# Patient Record
Sex: MALE | Race: WHITE | NOT HISPANIC OR LATINO | Employment: OTHER | URBAN - METROPOLITAN AREA
[De-identification: names, ages, dates, MRNs, and addresses within clinical notes are randomized per-mention and may not be internally consistent; named-entity substitution may affect disease eponyms.]

---

## 2017-02-08 RX ORDER — LOSARTAN POTASSIUM AND HYDROCHLOROTHIAZIDE 12.5; 5 MG/1; MG/1
TABLET ORAL
Qty: 90 TABLET | Refills: 1 | Status: SHIPPED | OUTPATIENT
Start: 2017-02-08 | End: 2017-05-08

## 2017-02-17 ENCOUNTER — OFFICE VISIT (OUTPATIENT)
Dept: INTERNAL MEDICINE | Facility: CLINIC | Age: 82
End: 2017-02-17

## 2017-02-17 VITALS
HEART RATE: 78 BPM | SYSTOLIC BLOOD PRESSURE: 118 MMHG | BODY MASS INDEX: 26.94 KG/M2 | HEIGHT: 70 IN | DIASTOLIC BLOOD PRESSURE: 80 MMHG | WEIGHT: 188.2 LBS

## 2017-02-17 DIAGNOSIS — E78.49 OTHER HYPERLIPIDEMIA: ICD-10-CM

## 2017-02-17 DIAGNOSIS — I10 ESSENTIAL HYPERTENSION: ICD-10-CM

## 2017-02-17 DIAGNOSIS — K21.9 GASTROESOPHAGEAL REFLUX DISEASE WITHOUT ESOPHAGITIS: ICD-10-CM

## 2017-02-17 DIAGNOSIS — M51.36 DEGENERATION OF INTERVERTEBRAL DISC OF LUMBAR REGION: Primary | ICD-10-CM

## 2017-02-17 DIAGNOSIS — I25.10 CHRONIC CORONARY ARTERY DISEASE: ICD-10-CM

## 2017-02-17 DIAGNOSIS — Z23 NEED FOR PNEUMOCOCCAL VACCINE: ICD-10-CM

## 2017-02-17 LAB
ALBUMIN SERPL-MCNC: 4.3 G/DL (ref 3.5–5.2)
ALBUMIN/GLOB SERPL: 1.8 G/DL
ALP SERPL-CCNC: 89 U/L (ref 39–117)
ALT SERPL-CCNC: 10 U/L (ref 1–41)
AST SERPL-CCNC: 13 U/L (ref 1–40)
BILIRUB SERPL-MCNC: 0.6 MG/DL (ref 0.1–1.2)
BUN SERPL-MCNC: 19 MG/DL (ref 8–23)
BUN/CREAT SERPL: 17.9 (ref 7–25)
CALCIUM SERPL-MCNC: 9.2 MG/DL (ref 8.6–10.5)
CHLORIDE SERPL-SCNC: 103 MMOL/L (ref 98–107)
CHOLEST SERPL-MCNC: 156 MG/DL (ref 0–200)
CO2 SERPL-SCNC: 24.2 MMOL/L (ref 22–29)
CREAT SERPL-MCNC: 1.06 MG/DL (ref 0.76–1.27)
GLOBULIN SER CALC-MCNC: 2.4 GM/DL
GLUCOSE SERPL-MCNC: 100 MG/DL (ref 65–99)
HDLC SERPL-MCNC: 38 MG/DL (ref 40–60)
LDLC SERPL CALC-MCNC: 96 MG/DL (ref 0–100)
POTASSIUM SERPL-SCNC: 4.2 MMOL/L (ref 3.5–5.2)
PROT SERPL-MCNC: 6.7 G/DL (ref 6–8.5)
SODIUM SERPL-SCNC: 143 MMOL/L (ref 136–145)
TRIGL SERPL-MCNC: 111 MG/DL (ref 0–150)
VLDLC SERPL-MCNC: 22.2 MG/DL (ref 5–40)

## 2017-02-17 PROCEDURE — 90670 PCV13 VACCINE IM: CPT | Performed by: INTERNAL MEDICINE

## 2017-02-17 PROCEDURE — 99214 OFFICE O/P EST MOD 30 MIN: CPT | Performed by: INTERNAL MEDICINE

## 2017-02-17 PROCEDURE — G0009 ADMIN PNEUMOCOCCAL VACCINE: HCPCS | Performed by: INTERNAL MEDICINE

## 2017-02-17 RX ORDER — CYCLOBENZAPRINE HCL 10 MG
10 TABLET ORAL
Qty: 30 TABLET | Refills: 5 | Status: SHIPPED | OUTPATIENT
Start: 2017-02-17 | End: 2018-04-18

## 2017-02-17 NOTE — PROGRESS NOTES
Subjective   Akin Chaudhry is a 81 y.o. male.     History of Present Illness he is here today for follow-up of hypertension, hypercholesterolemia, and coronary artery disease.  His difficulties with abdominal pain during 2016 have resolved spontaneously.  He is no longer using Nexium or Prevacid for reflux.  He occasionally will take TUMS.  He denies any PND, dyspnea on exertion, or chest pain.  He has had no further dizziness.  He denies any focal neurologic symptoms.  His bowel habit is normal and he denies any melanotic stool or rectal bleeding.  His only real complaint today is diffuse chronic joint pain as well as low back pain.  He does not take any medication for this.  Used an epidural block once for his low back pain without relief.  He also complains of pain in his thighs at night especially.  His low back pain improves as the morning goes on generally.        Review of Systems   Constitutional: Negative for activity change, appetite change and fatigue.   HENT: Negative for trouble swallowing.    Respiratory: Negative for cough, chest tightness, shortness of breath and wheezing.    Cardiovascular: Negative for chest pain, palpitations and leg swelling.   Gastrointestinal: Negative for abdominal pain, blood in stool, constipation, diarrhea, nausea and vomiting.   Genitourinary: Negative for difficulty urinating, dysuria, flank pain, frequency and hematuria.   Musculoskeletal: Positive for arthralgias and back pain. Negative for gait problem and joint swelling.   Neurological: Negative for dizziness, syncope, facial asymmetry, speech difficulty, weakness, numbness and headaches.   Psychiatric/Behavioral: Negative for confusion and dysphoric mood. The patient is not nervous/anxious.        Objective   Physical Exam   Constitutional: He is oriented to person, place, and time. Vital signs are normal. He appears well-developed and well-nourished. He is active. He does not appear ill.   Eyes: Conjunctivae are  normal.   Fundoscopic exam:       The right eye shows no AV nicking, no exudate and no hemorrhage.        The left eye shows no AV nicking, no exudate and no hemorrhage.   Neck: Carotid bruit is not present. No thyroid mass and no thyromegaly present.   Cardiovascular: Normal rate, regular rhythm, S1 normal and S2 normal.  Exam reveals no S3 and no S4.    No murmur heard.  Pulses:       Posterior tibial pulses are 2+ on the right side, and 2+ on the left side.   Pulmonary/Chest: No tachypnea. No respiratory distress. He has no decreased breath sounds. He has no wheezes. He has no rhonchi. He has no rales.   Abdominal: Soft. Normal appearance and bowel sounds are normal. He exhibits no abdominal bruit and no mass. There is no hepatosplenomegaly. There is no tenderness.       Vascular Status -  His exam exhibits no right foot edema. His exam exhibits no left foot edema.  Neurological: He is alert and oriented to person, place, and time.   Reflex Scores:       Bicep reflexes are 2+ on the right side.  Psychiatric: He has a normal mood and affect. His speech is normal and behavior is normal. Judgment and thought content normal. Cognition and memory are normal.       Assessment/Plan  assessment #1 hypertension-good control #2 coronary artery disease-quiescent #3 hypercholesterolemia-hopefully at goal now that he is back on Lipitor    Plan #1 flu vaccination and Prevnar 13 today #2 CMP and lipids today.  Routine follow-up with me in 4 months.

## 2017-03-13 RX ORDER — FINASTERIDE 1 MG/1
TABLET, FILM COATED ORAL
Qty: 90 TABLET | Refills: 1 | Status: SHIPPED | OUTPATIENT
Start: 2017-03-13 | End: 2017-12-08 | Stop reason: SDUPTHER

## 2017-03-13 RX ORDER — ATORVASTATIN CALCIUM 40 MG/1
TABLET, FILM COATED ORAL
Qty: 90 TABLET | Refills: 1 | Status: SHIPPED | OUTPATIENT
Start: 2017-03-13 | End: 2017-10-18 | Stop reason: SDUPTHER

## 2017-05-08 ENCOUNTER — OFFICE VISIT (OUTPATIENT)
Dept: INTERNAL MEDICINE | Facility: CLINIC | Age: 82
End: 2017-05-08

## 2017-05-08 VITALS
WEIGHT: 190 LBS | BODY MASS INDEX: 27.2 KG/M2 | HEIGHT: 70 IN | DIASTOLIC BLOOD PRESSURE: 70 MMHG | HEART RATE: 78 BPM | SYSTOLIC BLOOD PRESSURE: 114 MMHG

## 2017-05-08 DIAGNOSIS — I10 ESSENTIAL HYPERTENSION: Primary | ICD-10-CM

## 2017-05-08 DIAGNOSIS — L98.9 SKIN LESION: ICD-10-CM

## 2017-05-08 PROBLEM — I87.2 CHRONIC VENOUS INSUFFICIENCY: Status: ACTIVE | Noted: 2017-05-08

## 2017-05-08 PROCEDURE — 99213 OFFICE O/P EST LOW 20 MIN: CPT | Performed by: INTERNAL MEDICINE

## 2017-05-08 RX ORDER — FUROSEMIDE 20 MG/1
20 TABLET ORAL DAILY
Qty: 90 TABLET | Refills: 3 | Status: SHIPPED | OUTPATIENT
Start: 2017-05-08 | End: 2018-02-12 | Stop reason: HOSPADM

## 2017-05-08 RX ORDER — LOSARTAN POTASSIUM 100 MG/1
100 TABLET ORAL DAILY
Qty: 90 TABLET | Refills: 3 | Status: SHIPPED | OUTPATIENT
Start: 2017-05-08 | End: 2018-06-01 | Stop reason: SDUPTHER

## 2017-05-26 ENCOUNTER — TELEPHONE (OUTPATIENT)
Dept: INTERNAL MEDICINE | Facility: CLINIC | Age: 82
End: 2017-05-26

## 2017-05-26 RX ORDER — POTASSIUM CHLORIDE 20 MEQ/1
20 TABLET, EXTENDED RELEASE ORAL DAILY
Qty: 30 TABLET | Refills: 0 | Status: SHIPPED | OUTPATIENT
Start: 2017-05-26 | End: 2017-07-05 | Stop reason: SDUPTHER

## 2017-06-16 ENCOUNTER — OFFICE VISIT (OUTPATIENT)
Dept: INTERNAL MEDICINE | Facility: CLINIC | Age: 82
End: 2017-06-16

## 2017-06-16 VITALS
HEIGHT: 70 IN | DIASTOLIC BLOOD PRESSURE: 70 MMHG | BODY MASS INDEX: 26.31 KG/M2 | HEART RATE: 72 BPM | SYSTOLIC BLOOD PRESSURE: 114 MMHG | WEIGHT: 183.8 LBS

## 2017-06-16 DIAGNOSIS — I10 ESSENTIAL HYPERTENSION: ICD-10-CM

## 2017-06-16 DIAGNOSIS — I87.2 CHRONIC VENOUS INSUFFICIENCY: Primary | ICD-10-CM

## 2017-06-16 PROBLEM — L98.9 SKIN LESION: Status: RESOLVED | Noted: 2017-05-08 | Resolved: 2017-06-16

## 2017-06-16 LAB
ANION GAP SERPL CALCULATED.3IONS-SCNC: 11 MMOL/L
BUN BLD-MCNC: 18 MG/DL (ref 6–22)
BUN/CREAT SERPL: 17.5 (ref 7–25)
CALCIUM SPEC-SCNC: 8.4 MG/DL (ref 8.6–10.5)
CHLORIDE SERPL-SCNC: 107 MMOL/L (ref 95–107)
CO2 SERPL-SCNC: 25 MMOL/L (ref 23–32)
CREAT BLD-MCNC: 1.03 MG/DL (ref 0.7–1.3)
GFR SERPL CREATININE-BSD FRML MDRD: 69 ML/MIN/1.73
GLUCOSE BLD-MCNC: 95 MG/DL (ref 70–100)
POTASSIUM BLD-SCNC: 4.3 MMOL/L (ref 3.3–5.3)
SODIUM BLD-SCNC: 143 MMOL/L (ref 136–145)

## 2017-06-16 PROCEDURE — 99213 OFFICE O/P EST LOW 20 MIN: CPT | Performed by: INTERNAL MEDICINE

## 2017-06-16 PROCEDURE — 80048 BASIC METABOLIC PNL TOTAL CA: CPT | Performed by: INTERNAL MEDICINE

## 2017-06-16 PROCEDURE — 36415 COLL VENOUS BLD VENIPUNCTURE: CPT | Performed by: INTERNAL MEDICINE

## 2017-06-16 NOTE — PROGRESS NOTES
Myriam Chaudhry is a 82 y.o. male.     History of Present Illness he is here today for follow-up of hypertension and chronic venous insufficiency with discontinuation of Norvasc and hydrochlorothiazide and institution of Lasix with potassium supplementation and increasing his dose of losartan.  He relates that he has had total resolution of the edema previously noted.  He has felt well.  He denies any PND, dyspnea on exertion, chest pain, dizziness, or focal neurologic symptoms.        Review of Systems   Constitutional: Negative for activity change, appetite change and fatigue.   Respiratory: Negative for cough, chest tightness, shortness of breath and wheezing.    Cardiovascular: Negative for chest pain, palpitations and leg swelling.   Gastrointestinal: Negative for abdominal pain, diarrhea, nausea and vomiting.   Genitourinary: Negative for flank pain and hematuria.   Neurological: Negative for dizziness, syncope, facial asymmetry, speech difficulty, weakness, numbness and headaches.       Objective   Physical Exam   Constitutional: He is oriented to person, place, and time. Vital signs are normal. He appears well-developed and well-nourished. He is active. He does not appear ill.   Eyes: Conjunctivae are normal.   Cardiovascular: Normal rate, regular rhythm, S1 normal and S2 normal.  Exam reveals no S3 and no S4.    No murmur heard.  Pulses:       Dorsalis pedis pulses are 2+ on the right side, and 2+ on the left side.   Pulmonary/Chest: No tachypnea. No respiratory distress. He has no decreased breath sounds. He has no wheezes. He has no rhonchi. He has no rales.   Abdominal: Soft. Normal appearance and bowel sounds are normal. He exhibits no abdominal bruit and no mass. There is no hepatosplenomegaly. There is no tenderness.       Vascular Status -  His exam exhibits no right foot edema. His exam exhibits no left foot edema.  Neurological: He is alert and oriented to person, place, and time.  Gait normal.   Psychiatric: He has a normal mood and affect. His speech is normal and behavior is normal. Judgment and thought content normal. Cognition and memory are normal.       Assessment/Plan assessment #1 hypertension-good control on present regimen #2 chronic feet is insufficiency-much improved off Norvasc and on Lasix    Plan #1 no change medication #2 BMP today.  Routine follow-up with me in 4 months.

## 2017-07-05 RX ORDER — POTASSIUM CHLORIDE 20 MEQ/1
TABLET, EXTENDED RELEASE ORAL
Qty: 30 TABLET | Refills: 5 | Status: SHIPPED | OUTPATIENT
Start: 2017-07-05 | End: 2018-01-09 | Stop reason: SDUPTHER

## 2017-08-24 ENCOUNTER — OFFICE VISIT (OUTPATIENT)
Dept: INTERNAL MEDICINE | Facility: CLINIC | Age: 82
End: 2017-08-24

## 2017-08-24 VITALS
BODY MASS INDEX: 26.05 KG/M2 | DIASTOLIC BLOOD PRESSURE: 78 MMHG | WEIGHT: 182 LBS | SYSTOLIC BLOOD PRESSURE: 130 MMHG | HEIGHT: 70 IN

## 2017-08-24 DIAGNOSIS — N41.0 ACUTE PROSTATITIS: Primary | ICD-10-CM

## 2017-08-24 LAB
BACTERIA UR QL AUTO: ABNORMAL /HPF
BILIRUB UR QL STRIP: NEGATIVE
CLARITY UR: CLEAR
COLOR UR: YELLOW
GLUCOSE UR STRIP-MCNC: NEGATIVE MG/DL
HGB UR QL STRIP.AUTO: ABNORMAL
HYALINE CASTS UR QL AUTO: ABNORMAL /LPF
KETONES UR QL STRIP: NEGATIVE
LEUKOCYTE ESTERASE UR QL STRIP.AUTO: NEGATIVE
NITRITE UR QL STRIP: NEGATIVE
PH UR STRIP.AUTO: <=5 [PH] (ref 5–8)
PROT UR QL STRIP: NEGATIVE
RBC # UR: ABNORMAL /HPF
REF LAB TEST METHOD: ABNORMAL
SP GR UR STRIP: 1.02 (ref 1–1.03)
SQUAMOUS #/AREA URNS HPF: ABNORMAL /HPF
UROBILINOGEN UR QL STRIP: ABNORMAL
WBC UR QL AUTO: ABNORMAL /HPF

## 2017-08-24 PROCEDURE — 99213 OFFICE O/P EST LOW 20 MIN: CPT | Performed by: INTERNAL MEDICINE

## 2017-08-24 PROCEDURE — 81001 URINALYSIS AUTO W/SCOPE: CPT | Performed by: INTERNAL MEDICINE

## 2017-08-24 RX ORDER — SULFAMETHOXAZOLE AND TRIMETHOPRIM 800; 160 MG/1; MG/1
1 TABLET ORAL 2 TIMES DAILY
Qty: 28 TABLET | Refills: 0 | Status: SHIPPED | OUTPATIENT
Start: 2017-08-24 | End: 2017-09-13

## 2017-08-26 LAB
BACTERIA UR CULT: NORMAL
Lab: NORMAL

## 2017-09-13 ENCOUNTER — OFFICE VISIT (OUTPATIENT)
Dept: INTERNAL MEDICINE | Facility: CLINIC | Age: 82
End: 2017-09-13

## 2017-09-13 VITALS
BODY MASS INDEX: 26.48 KG/M2 | HEART RATE: 72 BPM | WEIGHT: 185 LBS | HEIGHT: 70 IN | SYSTOLIC BLOOD PRESSURE: 118 MMHG | DIASTOLIC BLOOD PRESSURE: 82 MMHG

## 2017-09-13 DIAGNOSIS — I25.10 CHRONIC CORONARY ARTERY DISEASE: ICD-10-CM

## 2017-09-13 DIAGNOSIS — N41.0 ACUTE PROSTATITIS: Primary | ICD-10-CM

## 2017-09-13 PROCEDURE — 99213 OFFICE O/P EST LOW 20 MIN: CPT | Performed by: INTERNAL MEDICINE

## 2017-09-13 NOTE — PROGRESS NOTES
Myriam Chaudhry is a 82 y.o. male.     History of Present Illness he is here today for follow-up of acute prostatitis.  After 10 days on Bactrim his symptoms were much improved but he had a diffuse rash and fortunately discontinue the Bactrim.  That was 1 week ago.  He denies any dysuria or increase in chronic nocturia.  He has not had any abdominal pain, diarrhea, melanotic stool, or rectal bleeding.  He did have intermittent bilateral chest pain located just below the lateral aspect of the clavicle on both sides first on the right and then on the left on an intermittent basis.  There is no radiation of the pain or associated symptoms.  The pain occurred at rest as well as with exertion.  He denied any dyspnea, sweating, or nausea.  There was no cough or wheezing.        Review of Systems   Constitutional: Negative for activity change, appetite change, chills, diaphoresis and fever.   Respiratory: Negative for cough, chest tightness, shortness of breath and wheezing.    Cardiovascular: Negative for chest pain, palpitations and leg swelling.   Gastrointestinal: Negative for abdominal pain, anal bleeding, blood in stool, diarrhea, nausea and vomiting.   Genitourinary: Negative for difficulty urinating, dysuria, flank pain, frequency and hematuria.   Musculoskeletal: Negative for back pain.   Neurological: Negative for dizziness, weakness and headaches.       Objective   Physical Exam   Constitutional: He is oriented to person, place, and time. Vital signs are normal. He appears well-developed and well-nourished. He is active. He does not appear ill.   Eyes: Conjunctivae are normal.   Cardiovascular: Normal rate, regular rhythm, S1 normal and S2 normal.  Exam reveals no S3 and no S4.    No murmur heard.  Pulmonary/Chest: No bradypnea. No respiratory distress. He has no decreased breath sounds. He has no wheezes. He has no rhonchi. He has no rales.   Abdominal: Soft. Normal appearance and bowel sounds are  normal. He exhibits no abdominal bruit and no mass. There is no hepatosplenomegaly. There is no tenderness.       Vascular Status -  His exam exhibits no right foot edema. His exam exhibits no left foot edema.  Neurological: He is alert and oriented to person, place, and time. Gait normal.   Psychiatric: He has a normal mood and affect. His speech is normal and behavior is normal. Judgment and thought content normal. Cognition and memory are normal.       Assessment/Plan recent urinalysis was normal    Assessment #1 acute prostatitis-much improved but only partially treated #2 coronary artery disease-his pain was clearly not anginal and I attempted to reassure him.    Plan #1 Cipro 500 mg twice a day for 7 days to complete course of acute prostatitis therapy.  He will call if symptoms return.

## 2017-10-18 RX ORDER — ATORVASTATIN CALCIUM 40 MG/1
TABLET, FILM COATED ORAL
Qty: 90 TABLET | Refills: 1 | Status: SHIPPED | OUTPATIENT
Start: 2017-10-18 | End: 2018-10-23 | Stop reason: SDUPTHER

## 2017-12-11 RX ORDER — FINASTERIDE 1 MG/1
TABLET, FILM COATED ORAL
Qty: 90 TABLET | Refills: 1 | Status: SHIPPED | OUTPATIENT
Start: 2017-12-11 | End: 2018-06-25 | Stop reason: SDUPTHER

## 2017-12-13 ENCOUNTER — OFFICE VISIT (OUTPATIENT)
Dept: INTERNAL MEDICINE | Facility: CLINIC | Age: 82
End: 2017-12-13

## 2017-12-13 VITALS
SYSTOLIC BLOOD PRESSURE: 140 MMHG | WEIGHT: 186 LBS | HEIGHT: 70 IN | HEART RATE: 64 BPM | DIASTOLIC BLOOD PRESSURE: 82 MMHG | BODY MASS INDEX: 26.63 KG/M2

## 2017-12-13 DIAGNOSIS — I10 ESSENTIAL HYPERTENSION: ICD-10-CM

## 2017-12-13 DIAGNOSIS — R35.0 BENIGN PROSTATIC HYPERPLASIA WITH URINARY FREQUENCY: Primary | ICD-10-CM

## 2017-12-13 DIAGNOSIS — N40.1 BENIGN PROSTATIC HYPERPLASIA WITH URINARY FREQUENCY: Primary | ICD-10-CM

## 2017-12-13 PROBLEM — N41.0 ACUTE PROSTATITIS: Status: RESOLVED | Noted: 2017-08-24 | Resolved: 2017-12-13

## 2017-12-13 PROCEDURE — 99214 OFFICE O/P EST MOD 30 MIN: CPT | Performed by: INTERNAL MEDICINE

## 2017-12-13 NOTE — PROGRESS NOTES
Myriam Chaudhry is a 82 y.o. male.     History of Present Illness he is here today for follow-up of recent acute prostatitis as well as hypertension.  He relates a one month history of increased daytime urinary frequency as well as intermittent urinary hesitancy.  He denies any dysuria or sense of incomplete voiding.  He has not had any abdominal pain.        Review of Systems   Constitutional: Negative for activity change, appetite change, chills, diaphoresis, fatigue and fever.   Respiratory: Negative for cough, chest tightness, shortness of breath and wheezing.    Cardiovascular: Negative for chest pain and leg swelling.   Gastrointestinal: Negative for abdominal pain.   Genitourinary: Positive for difficulty urinating and frequency. Negative for dysuria, flank pain and hematuria.   Neurological: Negative for dizziness, syncope, weakness and headaches.       Objective   Physical Exam   Constitutional: He is oriented to person, place, and time. He appears well-developed and well-nourished. He is active. He does not appear ill.   Eyes: Conjunctivae are normal.   Cardiovascular: Normal rate, regular rhythm, S1 normal and S2 normal.  Exam reveals no S3 and no S4.    No murmur heard.  Pulmonary/Chest: No tachypnea. No respiratory distress. He has no decreased breath sounds. He has no wheezes. He has no rhonchi. He has no rales.   Abdominal: Soft. Normal appearance and bowel sounds are normal. He exhibits no abdominal bruit and no mass. There is no hepatosplenomegaly. There is no tenderness.       Vascular Status -  His exam exhibits no right foot edema. His exam exhibits no left foot edema.  Neurological: He is alert and oriented to person, place, and time. Gait normal.   Psychiatric: He has a normal mood and affect. His speech is normal and behavior is normal. Judgment and thought content normal. Cognition and memory are normal.       Assessment/Plan assessment #1 hypertension-borderline systolic today  #2 acute prostatitis-resolved but now with symptoms of BPH    Plan #1 no change medication #2 urology consult.  Routine follow-up with me in 4 months.

## 2018-01-09 RX ORDER — POTASSIUM CHLORIDE 20 MEQ/1
TABLET, EXTENDED RELEASE ORAL
Qty: 30 TABLET | Refills: 5 | Status: SHIPPED | OUTPATIENT
Start: 2018-01-09 | End: 2018-08-08 | Stop reason: SDUPTHER

## 2018-01-26 ENCOUNTER — PREP FOR SURGERY (OUTPATIENT)
Dept: OTHER | Facility: HOSPITAL | Age: 83
End: 2018-01-26

## 2018-01-26 DIAGNOSIS — K21.9 ACID REFLUX: ICD-10-CM

## 2018-01-26 DIAGNOSIS — R10.84 GENERALIZED ABDOMINAL PAIN: ICD-10-CM

## 2018-01-26 DIAGNOSIS — R19.4 CHANGE IN BOWEL HABITS: Primary | ICD-10-CM

## 2018-01-26 DIAGNOSIS — K59.00 CONSTIPATION, UNSPECIFIED CONSTIPATION TYPE: ICD-10-CM

## 2018-02-01 PROBLEM — K21.9 ACID REFLUX: Status: ACTIVE | Noted: 2018-02-01

## 2018-02-01 PROBLEM — R19.4 CHANGE IN BOWEL HABITS: Status: ACTIVE | Noted: 2018-02-01

## 2018-02-01 PROBLEM — R10.84 GENERALIZED ABDOMINAL PAIN: Status: ACTIVE | Noted: 2018-02-01

## 2018-02-01 PROBLEM — K59.00 CONSTIPATION: Status: ACTIVE | Noted: 2018-02-01

## 2018-02-12 ENCOUNTER — ANESTHESIA EVENT (OUTPATIENT)
Dept: GASTROENTEROLOGY | Facility: HOSPITAL | Age: 83
End: 2018-02-12

## 2018-02-12 ENCOUNTER — ANESTHESIA (OUTPATIENT)
Dept: GASTROENTEROLOGY | Facility: HOSPITAL | Age: 83
End: 2018-02-12

## 2018-02-12 ENCOUNTER — HOSPITAL ENCOUNTER (OUTPATIENT)
Facility: HOSPITAL | Age: 83
Setting detail: HOSPITAL OUTPATIENT SURGERY
Discharge: HOME | End: 2018-02-12
Attending: SURGERY | Admitting: SURGERY

## 2018-02-12 VITALS
HEIGHT: 70 IN | OXYGEN SATURATION: 94 % | DIASTOLIC BLOOD PRESSURE: 65 MMHG | HEART RATE: 70 BPM | BODY MASS INDEX: 25.24 KG/M2 | WEIGHT: 176.31 LBS | SYSTOLIC BLOOD PRESSURE: 94 MMHG | RESPIRATION RATE: 16 BRPM | TEMPERATURE: 98.4 F

## 2018-02-12 DIAGNOSIS — K21.9 ACID REFLUX: ICD-10-CM

## 2018-02-12 DIAGNOSIS — R10.84 GENERALIZED ABDOMINAL PAIN: ICD-10-CM

## 2018-02-12 DIAGNOSIS — K59.00 CONSTIPATION, UNSPECIFIED CONSTIPATION TYPE: ICD-10-CM

## 2018-02-12 DIAGNOSIS — R19.4 CHANGE IN BOWEL HABITS: ICD-10-CM

## 2018-02-12 PROCEDURE — 25010000002 PROPOFOL 10 MG/ML EMULSION: Performed by: ANESTHESIOLOGY

## 2018-02-12 PROCEDURE — 88305 TISSUE EXAM BY PATHOLOGIST: CPT | Performed by: SURGERY

## 2018-02-12 PROCEDURE — 88312 SPECIAL STAINS GROUP 1: CPT | Performed by: SURGERY

## 2018-02-12 RX ORDER — SODIUM CHLORIDE 9 MG/ML
1000 INJECTION, SOLUTION INTRAVENOUS CONTINUOUS
Status: DISCONTINUED | OUTPATIENT
Start: 2018-02-12 | End: 2018-02-12 | Stop reason: HOSPADM

## 2018-02-12 RX ORDER — LIDOCAINE HYDROCHLORIDE 20 MG/ML
INJECTION, SOLUTION INFILTRATION; PERINEURAL AS NEEDED
Status: DISCONTINUED | OUTPATIENT
Start: 2018-02-12 | End: 2018-02-12 | Stop reason: SURG

## 2018-02-12 RX ORDER — TAMSULOSIN HYDROCHLORIDE 0.4 MG/1
1 CAPSULE ORAL NIGHTLY
COMMUNITY

## 2018-02-12 RX ORDER — PROPOFOL 10 MG/ML
VIAL (ML) INTRAVENOUS AS NEEDED
Status: DISCONTINUED | OUTPATIENT
Start: 2018-02-12 | End: 2018-02-12 | Stop reason: SURG

## 2018-02-12 RX ADMIN — PROPOFOL 200 MG: 10 INJECTION, EMULSION INTRAVENOUS at 11:25

## 2018-02-12 RX ADMIN — SODIUM CHLORIDE 1000 ML: 9 INJECTION, SOLUTION INTRAVENOUS at 10:47

## 2018-02-12 RX ADMIN — PROPOFOL 200 MG: 10 INJECTION, EMULSION INTRAVENOUS at 11:15

## 2018-02-12 RX ADMIN — LIDOCAINE HYDROCHLORIDE 100 MG: 20 INJECTION, SOLUTION INFILTRATION; PERINEURAL at 11:15

## 2018-02-12 NOTE — OP NOTE
February 12, 2018    Akin Chaudhry  [id number]    PROCEDURE: Esophagogastroduodenoscopy with biopsy and colonoscopy to cecum with biopsy.    PREOPERATIVE DIAGNOSIS:  Generalized abdominal pain [R10.84]  Change in bowel habits [R19.4]  Acid reflux [K21.9]  Constipation, unspecified constipation type [K59.00]    POSTOPERATIVE DIAGNOSIS: Mild reflux esophagitis, mild antral gastritis, duodenal nodules with pathology pending, sessile sigmoid colon polyp and moderate to severe sigmoid diverticulosis.    SURGEON:  Jose De Jesus Cardona M.D.    ASSISTANT:  None.    ANESTHESIA:  MAC    ESTIMATED BLOOD LOSS:  Minimal    SPECIMENS:    Order Name Source Comment Collection Info Order Time   TISSUE PATHOLOGY EXAM Small Intestine, Duodenum  Collected By: Jose De Jesus Cardona MD 2/12/2018 11:20 AM       INDICATIONS:  Patient is an elderly gentleman whose had some stomach and GI issues as listed above who presents today for a GI evaluation for the same.  His last evaluation was four so years ago and so brief check was felt indicated.  He now presents.    PROCEDURE:    Patient was brought into the endoscopy room and confirmed. He was positioned in the left lateral decubitus position.  After administration of adequate IV sedation by anesthesia, an upper endoscope was inserted the posterior pharynx and advanced through the upper GI tract to the third portion of the duodenum. The scope was then pulled back into the stomach and retroflexed.  Picture documentation was performed.  Biopsies were taken. Findings included mild reflux esophagitis, minimal distal gastritis and duodenal nodules of unknown origin.  Then, patient was turned around and repositioned for second portion of the procedure.  A digital rectal exam was performed and a colonoscope was inserted and advanced all the way to the cecum.  The scope was then removed with a slow reevaluation on the way out and the procedure was completed.  Biopsies were taken.  Findings included a distal  sigmoid colon polyp and diverticulosis of a moderate to severe degree in the sigmoid colon. Procedure was then concluded and my recommendations will depend on final biopsy results and ultimate findings.  Patient will be sent home today with instructions to call for those results.      Jose De Jesus Cardona M.D.

## 2018-02-12 NOTE — H&P
Akin Chaudhry is a 82 y.o. male who presents today for a EGD and Colonoscopy.  Patient has not been evaluated in over 4 years and has been having nonspecific abdominal and GI complaints for which upper lower endoscopy recommended again.    Past Medical History:   Diagnosis Date   • Abdominal pain, suprapubic    • Acute upper respiratory infection    • Constipation    • Foot pain    • HLD (hyperlipidemia)    • Limb pain    • Loss of hair    • Muscular aches    • Pain in soft tissues of limb    • Rash    • Rash          Objective     Pt is in no distress.  Heart regular.  Chest clear.  Abdomen soft.  Rectal deferred to endoscopy.      Assessment/Plan      Plan a EGD and Colonoscopy today.  Risks and benefits were discussed.  Patient is agreeable.  Final recommendations will follow depending on the results.

## 2018-02-12 NOTE — ANESTHESIA PREPROCEDURE EVALUATION
Anesthesia Evaluation     Patient summary reviewed and Nursing notes reviewed   NPO Solid Status: > 8 hours  NPO Liquid Status: > 8 hours           Airway   Mallampati: II  TM distance: >3 FB  Neck ROM: limited  possible difficult intubation  Dental - normal exam     Pulmonary - normal exam   Cardiovascular - normal exam    (+) hypertension (one med) poorly controlled, CAD,       Neuro/Psych  GI/Hepatic/Renal/Endo      Musculoskeletal     Abdominal  - normal exam    Bowel sounds: normal.   Substance History      OB/GYN          Other                        Anesthesia Plan    ASA 3     MAC     Anesthetic plan and risks discussed with patient.

## 2018-02-12 NOTE — ANESTHESIA POSTPROCEDURE EVALUATION
"Patient: Akin Chaudhry    Procedure Summary     Date Anesthesia Start Anesthesia Stop Room / Location    02/12/18 1115 1140  [facility] /  [facility]       Procedure Diagnosis Surgeon Provider    COLONOSCOPY TO CECUM WITH COLD POLYPECTOMY (N/A ); ESOPHAGOGASTRODUODENOSCOPY WITH COLD BIOPSIES (N/A Esophagus) Generalized abdominal pain; Change in bowel habits; Acid reflux; Constipation, unspecified constipation type  (Generalized abdominal pain [R10.84]; Change in bowel habits [R19.4]; Acid reflux [K21.9]; Constipation, unspecified constipation type [K59.00]) MD Albania Jaramillo MD          Anesthesia Type: MAC  Last vitals  BP   (!) 81/60 (02/12/18 1141)   Temp   36.7 °C (98 °F) (02/12/18 1030)   Pulse   88 (02/12/18 1141)   Resp   16 (02/12/18 1141)     SpO2   97 % (02/12/18 1030)     Post Anesthesia Care and Evaluation    Patient location during evaluation: bedside  Patient participation: complete - patient participated  Level of consciousness: awake and alert  Pain management: adequate  Airway patency: patent  Anesthetic complications: No anesthetic complications    Cardiovascular status: acceptable  Respiratory status: acceptable  Hydration status: acceptable    Comments: BP (!) 81/60 (BP Location: Left arm, Patient Position: Lying)  Pulse 88  Temp 36.7 °C (98 °F) (Oral)   Resp 16  Ht 177.8 cm (70\")  Wt 80 kg (176 lb 5 oz)  SpO2 97%  BMI 25.3 kg/m2      "

## 2018-02-12 NOTE — PLAN OF CARE
Problem: Patient Care Overview (Adult)  Goal: Plan of Care Review  Outcome: Ongoing (interventions implemented as appropriate)   02/12/18 1028   Coping/Psychosocial Response Interventions   Plan Of Care Reviewed With patient   Patient Care Overview   Progress no change     Goal: Adult Individualization and Mutuality  Outcome: Ongoing (interventions implemented as appropriate)    Goal: Discharge Needs Assessment  Outcome: Ongoing (interventions implemented as appropriate)      Problem: GI Endoscopy (Adult)  Goal: Signs and Symptoms of Listed Potential Problems Will be Absent or Manageable (GI Endoscopy)  Outcome: Ongoing (interventions implemented as appropriate)

## 2018-02-13 LAB
CYTO UR: NORMAL
LAB AP CASE REPORT: NORMAL
LAB AP INTRADEPARTMENTAL CONSULT: NORMAL
Lab: NORMAL
PATH REPORT.FINAL DX SPEC: NORMAL
PATH REPORT.GROSS SPEC: NORMAL

## 2018-04-18 ENCOUNTER — OFFICE VISIT (OUTPATIENT)
Dept: INTERNAL MEDICINE | Facility: CLINIC | Age: 83
End: 2018-04-18

## 2018-04-18 VITALS
SYSTOLIC BLOOD PRESSURE: 172 MMHG | DIASTOLIC BLOOD PRESSURE: 80 MMHG | HEIGHT: 70 IN | BODY MASS INDEX: 26.34 KG/M2 | WEIGHT: 184 LBS | HEART RATE: 60 BPM | OXYGEN SATURATION: 97 %

## 2018-04-18 DIAGNOSIS — I10 ESSENTIAL HYPERTENSION: ICD-10-CM

## 2018-04-18 DIAGNOSIS — R35.0 BENIGN PROSTATIC HYPERPLASIA WITH URINARY FREQUENCY: Primary | ICD-10-CM

## 2018-04-18 DIAGNOSIS — N40.1 BENIGN PROSTATIC HYPERPLASIA WITH URINARY FREQUENCY: Primary | ICD-10-CM

## 2018-04-18 DIAGNOSIS — R09.89 LEFT CAROTID BRUIT: ICD-10-CM

## 2018-04-18 DIAGNOSIS — I25.10 CHRONIC CORONARY ARTERY DISEASE: ICD-10-CM

## 2018-04-18 DIAGNOSIS — E78.49 OTHER HYPERLIPIDEMIA: ICD-10-CM

## 2018-04-18 PROBLEM — K59.00 CONSTIPATION: Status: RESOLVED | Noted: 2018-02-01 | Resolved: 2018-04-18

## 2018-04-18 PROBLEM — R19.4 CHANGE IN BOWEL HABITS: Status: RESOLVED | Noted: 2018-02-01 | Resolved: 2018-04-18

## 2018-04-18 PROBLEM — R10.84 GENERALIZED ABDOMINAL PAIN: Status: RESOLVED | Noted: 2018-02-01 | Resolved: 2018-04-18

## 2018-04-18 LAB
ALBUMIN SERPL-MCNC: 4.6 G/DL (ref 3.5–5.2)
ALBUMIN/GLOB SERPL: 2.4 G/DL
ALP SERPL-CCNC: 75 U/L (ref 39–117)
ALT SERPL-CCNC: 15 U/L (ref 1–41)
AST SERPL-CCNC: 14 U/L (ref 1–40)
BASOPHILS # BLD AUTO: 0.02 10*3/MM3 (ref 0–0.2)
BASOPHILS NFR BLD AUTO: 0.3 % (ref 0–1.5)
BILIRUB SERPL-MCNC: 1.1 MG/DL (ref 0.1–1.2)
BUN SERPL-MCNC: 14 MG/DL (ref 8–23)
BUN/CREAT SERPL: 13.9 (ref 7–25)
CALCIUM SERPL-MCNC: 9.4 MG/DL (ref 8.6–10.5)
CHLORIDE SERPL-SCNC: 103 MMOL/L (ref 98–107)
CHOLEST SERPL-MCNC: 164 MG/DL (ref 0–200)
CO2 SERPL-SCNC: 27.9 MMOL/L (ref 22–29)
CREAT SERPL-MCNC: 1.01 MG/DL (ref 0.76–1.27)
EOSINOPHIL # BLD AUTO: 0.1 10*3/MM3 (ref 0–0.7)
EOSINOPHIL # BLD AUTO: 1.4 % (ref 0.3–6.2)
ERYTHROCYTE [DISTWIDTH] IN BLOOD BY AUTOMATED COUNT: 14.9 % (ref 11.5–14.5)
GLOBULIN SER CALC-MCNC: 1.9 GM/DL
GLUCOSE SERPL-MCNC: 89 MG/DL (ref 65–99)
HCT VFR BLD AUTO: 50.9 % (ref 40.4–52.2)
HDLC SERPL-MCNC: 45 MG/DL (ref 40–60)
HGB BLD-MCNC: 16.2 G/DL (ref 13.7–17.6)
IMM GRANULOCYTES # BLD: 0 10*3/MM3 (ref 0–0.03)
IMM GRANULOCYTES NFR BLD: 0 % (ref 0–0.5)
LDLC SERPL CALC-MCNC: 94 MG/DL (ref 0–100)
LYMPHOCYTES # BLD AUTO: 2.11 10*3/MM3 (ref 0.9–4.8)
LYMPHOCYTES NFR BLD AUTO: 29.7 % (ref 19.6–45.3)
MCH RBC QN AUTO: 29 PG (ref 27–32.7)
MCHC RBC AUTO-ENTMCNC: 31.8 G/DL (ref 32.6–36.4)
MCV RBC AUTO: 91.1 FL (ref 79.8–96.2)
MONOCYTES # BLD AUTO: 0.45 10*3/MM3 (ref 0.2–1.2)
MONOCYTES NFR BLD AUTO: 6.3 % (ref 5–12)
NEUTROPHILS # BLD AUTO: 4.43 10*3/MM3 (ref 1.9–8.1)
NEUTROPHILS NFR BLD AUTO: 62.3 % (ref 42.7–76)
PLATELET # BLD AUTO: 184 10*3/MM3 (ref 140–500)
POTASSIUM SERPL-SCNC: 5 MMOL/L (ref 3.5–5.2)
PROT SERPL-MCNC: 6.5 G/DL (ref 6–8.5)
RBC # BLD AUTO: 5.59 10*6/MM3 (ref 4.6–6)
SODIUM SERPL-SCNC: 143 MMOL/L (ref 136–145)
TRIGL SERPL-MCNC: 126 MG/DL (ref 0–150)
VLDLC SERPL-MCNC: 25.2 MG/DL (ref 5–40)
WBC # BLD AUTO: 7.11 10*3/MM3 (ref 4.5–10.7)

## 2018-04-18 PROCEDURE — 99214 OFFICE O/P EST MOD 30 MIN: CPT | Performed by: INTERNAL MEDICINE

## 2018-04-18 NOTE — PROGRESS NOTES
Myriam Chaudhry is a 83 y.o. male.     History of Present Illness he is here today for his yearly follow-up which includes hypertension, hypercholesterolemia, BPH, and coronary artery disease.  He was seen by his urologist in late 2017 and discussion was undertaken for TURP for BPH.  He had cystoscopy earlier in the year which did not show any bladder tumor and there is no suspicion of prostate cancer.  He is urinating somewhat better on finasteride and Flomax.  He has one per night nocturia.  During the daytime he urinates every 2 or 3 hours.  There is no dysuria or hesitancy.  He does have a sense of incomplete voiding although bladder scans have not showed any residual.  He relates a prominence in the left neck over the last month and a half.  It is painless.  He denies any dysphagia.  He has not had any dizziness, syncope, headache, or focal neurologic episodes.  He denies any chest pain, PND, KELLEY, persistent cough, or wheezing.  He has not had any further abdominal pain.        Review of Systems   Constitutional: Negative for activity change, appetite change and fatigue.   HENT: Negative for trouble swallowing.    Respiratory: Negative for cough, chest tightness, shortness of breath and wheezing.    Cardiovascular: Negative for chest pain, palpitations and leg swelling.   Gastrointestinal: Negative for abdominal pain, anal bleeding, blood in stool, constipation, diarrhea, nausea and vomiting.   Genitourinary: Positive for frequency. Negative for difficulty urinating, dysuria, flank pain and nocturia.   Neurological: Negative for dizziness, syncope, facial asymmetry, speech difficulty and weakness.   Psychiatric/Behavioral: Negative for depressed mood. The patient is nervous/anxious.        Objective   Physical Exam   Constitutional: He is oriented to person, place, and time. He appears well-developed and well-nourished. He is active. He does not appear ill.   Eyes: Conjunctivae are normal.    Fundoscopic exam:       The right eye shows no AV nicking, no exudate and no hemorrhage.        The left eye shows no AV nicking, no exudate and no hemorrhage.   Neck: Carotid bruit is not present. No thyroid mass and no thyromegaly present.       Cardiovascular: Normal rate, regular rhythm, S1 normal and S2 normal.  Exam reveals no S3 and no S4.    No murmur heard.  Pulses:       Posterior tibial pulses are 2+ on the right side, and 2+ on the left side.   Pulmonary/Chest: No tachypnea. No respiratory distress. He has no decreased breath sounds. He has no wheezes. He has no rhonchi. He has no rales.   Abdominal: Soft. Normal appearance and bowel sounds are normal. He exhibits no abdominal bruit and no mass. There is no hepatosplenomegaly. There is no tenderness.     Vascular Status -  His right foot exhibits no edema. His left foot exhibits no edema.  Lymphadenopathy:     He has no cervical adenopathy.   Neurological: He is alert and oriented to person, place, and time. Gait normal.   Reflex Scores:       Bicep reflexes are 2+ on the right side.  Psychiatric: He has a normal mood and affect. His speech is normal and behavior is normal. Judgment and thought content normal. Cognition and memory are normal.         Assessment/Plan colonoscopy in December showed a tubular adenoma which was removed.  EGD showed mild gastritis.    Assessment #1 hypertension-up today and he did not take his medication this morning #2 hypercholesterolemia-well-controlled #3 coronary artery disease-subcritical stenosis documented several years ago and this is quiescent #4 BPH-some improvement with medication    Plan #1 no change medication #2 carotid Doppler #3 CBC, CMP, lipids today.  Routine follow-up with me in one month for blood pressure recheck.

## 2018-04-23 ENCOUNTER — HOSPITAL ENCOUNTER (OUTPATIENT)
Dept: CARDIOLOGY | Facility: HOSPITAL | Age: 83
Discharge: HOME | End: 2018-04-23
Attending: INTERNAL MEDICINE | Admitting: INTERNAL MEDICINE

## 2018-04-23 DIAGNOSIS — R09.89 LEFT CAROTID BRUIT: ICD-10-CM

## 2018-04-23 LAB
BH CV XLRA MEAS LEFT DIST CCA EDV: 10.6 CM/SEC
BH CV XLRA MEAS LEFT DIST CCA PSV: 41 CM/SEC
BH CV XLRA MEAS LEFT DIST ICA EDV: -16.8 CM/SEC
BH CV XLRA MEAS LEFT DIST ICA PSV: -57.7 CM/SEC
BH CV XLRA MEAS LEFT MID ICA EDV: -24.6 CM/SEC
BH CV XLRA MEAS LEFT MID ICA PSV: -69.8 CM/SEC
BH CV XLRA MEAS LEFT PROX CCA EDV: 14.7 CM/SEC
BH CV XLRA MEAS LEFT PROX CCA PSV: 69.8 CM/SEC
BH CV XLRA MEAS LEFT PROX ECA EDV: -8.2 CM/SEC
BH CV XLRA MEAS LEFT PROX ECA PSV: -75.6 CM/SEC
BH CV XLRA MEAS LEFT PROX ICA EDV: -16.4 CM/SEC
BH CV XLRA MEAS LEFT PROX ICA PSV: -43.4 CM/SEC
BH CV XLRA MEAS LEFT PROX SCLA PSV: 63.3 CM/SEC
BH CV XLRA MEAS LEFT VERTEBRAL A EDV: 11.1 CM/SEC
BH CV XLRA MEAS LEFT VERTEBRAL A PSV: 41.6 CM/SEC
BH CV XLRA MEAS RIGHT DIST CCA EDV: -11.7 CM/SEC
BH CV XLRA MEAS RIGHT DIST CCA PSV: -47.5 CM/SEC
BH CV XLRA MEAS RIGHT DIST ICA EDV: -10.5 CM/SEC
BH CV XLRA MEAS RIGHT DIST ICA PSV: -41.6 CM/SEC
BH CV XLRA MEAS RIGHT MID ICA EDV: 20.5 CM/SEC
BH CV XLRA MEAS RIGHT MID ICA PSV: 58.6 CM/SEC
BH CV XLRA MEAS RIGHT PROX CCA EDV: 13.5 CM/SEC
BH CV XLRA MEAS RIGHT PROX CCA PSV: 83.3 CM/SEC
BH CV XLRA MEAS RIGHT PROX ECA EDV: -8.2 CM/SEC
BH CV XLRA MEAS RIGHT PROX ECA PSV: -76.8 CM/SEC
BH CV XLRA MEAS RIGHT PROX ICA EDV: 9.9 CM/SEC
BH CV XLRA MEAS RIGHT PROX ICA PSV: 44 CM/SEC
BH CV XLRA MEAS RIGHT PROX SCLA PSV: 72.1 CM/SEC
BH CV XLRA MEAS RIGHT VERTEBRAL A EDV: -12.9 CM/SEC
BH CV XLRA MEAS RIGHT VERTEBRAL A PSV: -53.9 CM/SEC
LEFT ARM BP: NORMAL MMHG
RIGHT ARM BP: NORMAL MMHG

## 2018-04-23 PROCEDURE — 93880 EXTRACRANIAL BILAT STUDY: CPT

## 2018-05-23 ENCOUNTER — OFFICE VISIT (OUTPATIENT)
Dept: INTERNAL MEDICINE | Facility: CLINIC | Age: 83
End: 2018-05-23

## 2018-05-23 VITALS
SYSTOLIC BLOOD PRESSURE: 142 MMHG | WEIGHT: 185 LBS | OXYGEN SATURATION: 96 % | DIASTOLIC BLOOD PRESSURE: 84 MMHG | HEART RATE: 60 BPM | HEIGHT: 70 IN | BODY MASS INDEX: 26.48 KG/M2

## 2018-05-23 DIAGNOSIS — I25.10 CHRONIC CORONARY ARTERY DISEASE: ICD-10-CM

## 2018-05-23 DIAGNOSIS — E78.49 OTHER HYPERLIPIDEMIA: ICD-10-CM

## 2018-05-23 DIAGNOSIS — I10 ESSENTIAL HYPERTENSION: Primary | ICD-10-CM

## 2018-05-23 PROCEDURE — 99213 OFFICE O/P EST LOW 20 MIN: CPT | Performed by: INTERNAL MEDICINE

## 2018-05-23 RX ORDER — HYDROCHLOROTHIAZIDE 12.5 MG/1
12.5 TABLET ORAL DAILY
Qty: 90 TABLET | Refills: 3 | Status: SHIPPED | OUTPATIENT
Start: 2018-05-23 | End: 2020-04-14 | Stop reason: HOSPADM

## 2018-05-23 NOTE — PROGRESS NOTES
Myriam Chaudhry is a 83 y.o. male.     History of Present Illness he is here today for follow-up of hypertension, hypercholesterolemia, and coronary artery disease.  He really has done well since his last visit.  He denies any chest pain, KELLEY, PND, or swelling in the ankles.  He denies any dizziness, syncope, or focal neurologic episodes.        Review of Systems   Constitutional: Negative for activity change, appetite change and fatigue.   HENT: Negative for trouble swallowing.    Cardiovascular: Negative for chest pain, palpitations and leg swelling.   Gastrointestinal: Negative for abdominal pain, anal bleeding, blood in stool, constipation, diarrhea, nausea and vomiting.   Genitourinary: Negative for flank pain and hematuria.   Neurological: Negative for dizziness, syncope, facial asymmetry, speech difficulty, weakness, numbness and headache.   Psychiatric/Behavioral: Negative for depressed mood. The patient is not nervous/anxious.        Objective   Physical Exam   Constitutional: He is oriented to person, place, and time. He appears well-developed and well-nourished. He is active. He does not appear ill.   Eyes: Conjunctivae are normal.   Neck: Carotid bruit is not present.   Cardiovascular: Normal rate, regular rhythm, S1 normal and S2 normal.  Exam reveals no S3 and no S4.    No murmur heard.  Pulses:       Posterior tibial pulses are 2+ on the right side, and 2+ on the left side.   Pulmonary/Chest: No tachypnea. No respiratory distress. He has no decreased breath sounds. He has no wheezes. He has no rhonchi. He has no rales.   Abdominal: Soft. Normal appearance and bowel sounds are normal. He exhibits no abdominal bruit and no mass. There is no hepatosplenomegaly. There is no tenderness.     Vascular Status -  His right foot exhibits no edema. His left foot exhibits no edema.  Neurological: He is alert and oriented to person, place, and time. Gait normal.   Psychiatric: He has a normal mood  and affect. His speech is normal and behavior is normal. Judgment and thought content normal. Cognition and memory are normal.         Assessment/Plan blood pressure by me today 166/86.  Recent lab shows normal CBC, CMP.  Total cholesterol 164 triglycerides 126 HDL cholesterol 45 LDL cholesterol 94.  Carotid Doppler shows minimal internal carotid artery narrowing.    Assessment #1 coronary artery disease-subcritical stenosis and asymptomatic for over a decade.  #2 hypercholesterolemia-good control #3 hypertension-needs better control    Plan #1 add hydrochlorothiazide 12.5 mg by mouth daily.  Routine follow-up in 4 months.

## 2018-05-30 ENCOUNTER — TELEPHONE (OUTPATIENT)
Dept: INTERNAL MEDICINE | Facility: CLINIC | Age: 83
End: 2018-05-30

## 2018-05-30 RX ORDER — FUROSEMIDE 20 MG/1
TABLET ORAL
Qty: 90 TABLET | Refills: 3 | Status: SHIPPED | OUTPATIENT
Start: 2018-05-30 | End: 2020-04-14 | Stop reason: HOSPADM

## 2018-05-30 NOTE — TELEPHONE ENCOUNTER
----- Message from Janna Moses sent at 5/30/2018 11:12 AM EDT -----  Contact: Patient   Patient called with possible medication reaction.  He was prescribed hydrochlorothiazide (HYDRODIURIL) 12.5 MG tablet and has taken 4 doses.  He states after taking the medication he gets SOA, dizzy, lightheaded, with tingling in arms and legs.  After stopping the medication, sxs have subsided.  He did not take medication today, and feels fine.  Please advise.     Patient:  [phone]    Pharmacy:  [facility] - [facility] [address] - [phone]  - [phone]

## 2018-06-01 RX ORDER — LOSARTAN POTASSIUM 100 MG/1
TABLET ORAL
Qty: 90 TABLET | Refills: 3 | Status: SHIPPED | OUTPATIENT
Start: 2018-06-01 | End: 2020-04-14 | Stop reason: HOSPADM

## 2018-06-25 RX ORDER — FINASTERIDE 1 MG/1
TABLET, FILM COATED ORAL
Qty: 90 TABLET | Refills: 1 | Status: SHIPPED | OUTPATIENT
Start: 2018-06-25

## 2018-08-08 RX ORDER — POTASSIUM CHLORIDE 20 MEQ/1
TABLET, EXTENDED RELEASE ORAL
Qty: 30 TABLET | Refills: 5 | Status: SHIPPED | OUTPATIENT
Start: 2018-08-08

## 2018-10-23 RX ORDER — ATORVASTATIN CALCIUM 40 MG/1
40 TABLET, FILM COATED ORAL DAILY
Qty: 90 TABLET | Refills: 1 | Status: SHIPPED | OUTPATIENT
Start: 2018-10-23

## 2019-01-23 ENCOUNTER — HOSPITAL ENCOUNTER (EMERGENCY)
Facility: HOSPITAL | Age: 84
Discharge: HOME | End: 2019-01-23
Attending: EMERGENCY MEDICINE | Admitting: EMERGENCY MEDICINE

## 2019-01-23 ENCOUNTER — APPOINTMENT (OUTPATIENT)
Dept: ULTRASOUND IMAGING | Facility: HOSPITAL | Age: 84
End: 2019-01-23

## 2019-01-23 ENCOUNTER — APPOINTMENT (OUTPATIENT)
Dept: GENERAL RADIOLOGY | Facility: HOSPITAL | Age: 84
End: 2019-01-23

## 2019-01-23 VITALS
BODY MASS INDEX: 27.74 KG/M2 | SYSTOLIC BLOOD PRESSURE: 168 MMHG | HEIGHT: 68 IN | TEMPERATURE: 98.5 F | OXYGEN SATURATION: 96 % | HEART RATE: 58 BPM | DIASTOLIC BLOOD PRESSURE: 99 MMHG | WEIGHT: 183 LBS | RESPIRATION RATE: 16 BRPM

## 2019-01-23 DIAGNOSIS — R10.10 UPPER ABDOMINAL PAIN: ICD-10-CM

## 2019-01-23 DIAGNOSIS — R07.89 ATYPICAL CHEST PAIN: Primary | ICD-10-CM

## 2019-01-23 LAB
ALBUMIN SERPL-MCNC: 4 G/DL (ref 3.5–5.2)
ALBUMIN/GLOB SERPL: 1.6 G/DL
ALP SERPL-CCNC: 71 U/L (ref 39–117)
ALT SERPL W P-5'-P-CCNC: 12 U/L (ref 1–41)
ANION GAP SERPL CALCULATED.3IONS-SCNC: 9.8 MMOL/L
AST SERPL-CCNC: 10 U/L (ref 1–40)
BASOPHILS # BLD AUTO: 0.01 10*3/MM3 (ref 0–0.2)
BASOPHILS NFR BLD AUTO: 0.1 % (ref 0–1.5)
BILIRUB SERPL-MCNC: 0.8 MG/DL (ref 0.1–1.2)
BUN BLD-MCNC: 21 MG/DL (ref 8–23)
BUN/CREAT SERPL: 20.2 (ref 7–25)
CALCIUM SPEC-SCNC: 9.3 MG/DL (ref 8.6–10.5)
CHLORIDE SERPL-SCNC: 105 MMOL/L (ref 98–107)
CO2 SERPL-SCNC: 27.2 MMOL/L (ref 22–29)
CREAT BLD-MCNC: 1.04 MG/DL (ref 0.76–1.27)
DEPRECATED RDW RBC AUTO: 46.6 FL (ref 37–54)
EOSINOPHIL # BLD AUTO: 0.15 10*3/MM3 (ref 0–0.7)
EOSINOPHIL NFR BLD AUTO: 2 % (ref 0.3–6.2)
ERYTHROCYTE [DISTWIDTH] IN BLOOD BY AUTOMATED COUNT: 14.2 % (ref 11.5–14.5)
GFR SERPL CREATININE-BSD FRML MDRD: 68 ML/MIN/1.73
GLOBULIN UR ELPH-MCNC: 2.5 GM/DL
GLUCOSE BLD-MCNC: 94 MG/DL (ref 65–99)
HCT VFR BLD AUTO: 48.1 % (ref 40.4–52.2)
HGB BLD-MCNC: 15.5 G/DL (ref 13.7–17.6)
IMM GRANULOCYTES # BLD AUTO: 0.02 10*3/MM3 (ref 0–0.03)
IMM GRANULOCYTES NFR BLD AUTO: 0.3 % (ref 0–0.5)
LYMPHOCYTES # BLD AUTO: 1.91 10*3/MM3 (ref 0.9–4.8)
LYMPHOCYTES NFR BLD AUTO: 26 % (ref 19.6–45.3)
MCH RBC QN AUTO: 29 PG (ref 27–32.7)
MCHC RBC AUTO-ENTMCNC: 32.2 G/DL (ref 32.6–36.4)
MCV RBC AUTO: 89.9 FL (ref 79.8–96.2)
MONOCYTES # BLD AUTO: 0.66 10*3/MM3 (ref 0.2–1.2)
MONOCYTES NFR BLD AUTO: 9 % (ref 5–12)
NEUTROPHILS # BLD AUTO: 4.59 10*3/MM3 (ref 1.9–8.1)
NEUTROPHILS NFR BLD AUTO: 62.6 % (ref 42.7–76)
PLATELET # BLD AUTO: 164 10*3/MM3 (ref 140–500)
PMV BLD AUTO: 9.7 FL (ref 6–12)
POTASSIUM BLD-SCNC: 3.8 MMOL/L (ref 3.5–5.2)
PROT SERPL-MCNC: 6.5 G/DL (ref 6–8.5)
RBC # BLD AUTO: 5.35 10*6/MM3 (ref 4.6–6)
SODIUM BLD-SCNC: 142 MMOL/L (ref 136–145)
TROPONIN T SERPL-MCNC: <0.01 NG/ML (ref 0–0.03)
WBC NRBC COR # BLD: 7.34 10*3/MM3 (ref 4.5–10.7)

## 2019-01-23 PROCEDURE — 93010 ELECTROCARDIOGRAM REPORT: CPT | Performed by: INTERNAL MEDICINE

## 2019-01-23 PROCEDURE — 93005 ELECTROCARDIOGRAM TRACING: CPT

## 2019-01-23 PROCEDURE — 99284 EMERGENCY DEPT VISIT MOD MDM: CPT

## 2019-01-23 PROCEDURE — 71046 X-RAY EXAM CHEST 2 VIEWS: CPT

## 2019-01-23 PROCEDURE — 93005 ELECTROCARDIOGRAM TRACING: CPT | Performed by: EMERGENCY MEDICINE

## 2019-01-23 PROCEDURE — 76705 ECHO EXAM OF ABDOMEN: CPT

## 2019-01-23 PROCEDURE — 84484 ASSAY OF TROPONIN QUANT: CPT | Performed by: EMERGENCY MEDICINE

## 2019-01-23 PROCEDURE — 85025 COMPLETE CBC W/AUTO DIFF WBC: CPT | Performed by: EMERGENCY MEDICINE

## 2019-01-23 PROCEDURE — 80053 COMPREHEN METABOLIC PANEL: CPT | Performed by: EMERGENCY MEDICINE

## 2019-01-23 RX ORDER — SODIUM CHLORIDE 0.9 % (FLUSH) 0.9 %
10 SYRINGE (ML) INJECTION AS NEEDED
Status: DISCONTINUED | OUTPATIENT
Start: 2019-01-23 | End: 2019-01-23 | Stop reason: HOSPADM

## 2019-01-23 NOTE — ED PROVIDER NOTES
EMERGENCY DEPARTMENT ENCOUNTER    CHIEF COMPLAINT  Chief Complaint: Chest pain  History given by: patient   History limited by: n/a  Room Number: 11/11  PMD: Neda Parker MD      HPI:  Pt is a 83 y.o. male who presents complaining of chest pain that has been intermittent for the past 2 days. Pt states that the pain lasts for about 2 minutes at a time, and he denies any known aggravating factors. He also reports epigastric abd pain, but denies nausea, vomiting, diaphoresis, chills, or fever. Currently, pt denies CP. He denies known cardiac hx.    Duration:  2 days   Onset: gradual  Timing: intermittent- lasts for 2 minutes at a time  Location: generalized chest   Radiation: none  Quality: pain  Intensity/Severity: moderate  Progression: unchanged   Associated Symptoms: epigastric abd pain  Aggravating Factors: none  Alleviating Factors: none  Previous Episodes: no known cardiac hx     PAST MEDICAL HISTORY  Active Ambulatory Problems     Diagnosis Date Noted   • Chronic coronary artery disease 02/03/2016   • Hyperlipidemia 02/03/2016   • Restless legs syndrome 02/03/2016   • Degeneration of intervertebral disc of lumbar region 10/13/2016   • Esophageal dysmotility 10/13/2016   • Gastroesophageal reflux disease 10/13/2016   • Hypertension 10/13/2016   • Chronic venous insufficiency 05/08/2017   • Benign prostatic hyperplasia with urinary frequency 12/13/2017   • Acid reflux 02/01/2018     Resolved Ambulatory Problems     Diagnosis Date Noted   • Acute gastritis 02/03/2016   • 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   • Urinary frequency 05/18/2016   • Acute abdominal pain 06/28/2016   • Generalized abdominal pain 08/29/2016   • Bladder polyps 09/26/2016   • History of colonoscopy 10/13/2016   • 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   • Skin lesion 05/08/2017   • Acute prostatitis 08/24/2017   • Generalized abdominal pain 02/01/2018   • Change in bowel habits 02/01/2018   • Constipation 02/01/2018     Past Medical  History:   Diagnosis Date   • Abdominal pain, suprapubic    • Acute upper respiratory infection    • Constipation    • Foot pain    • HLD (hyperlipidemia)    • Limb pain    • Loss of hair    • Muscular aches    • Pain in soft tissues of limb    • Rash    • Rash        PAST SURGICAL HISTORY  Past Surgical History:   Procedure Laterality Date   • COLONOSCOPY     • COLONOSCOPY N/A 2018    Procedure: COLONOSCOPY TO CECUM WITH COLD POLYPECTOMY;  Surgeon: Jose De Jesus Cardona MD;  Location: [facility];  Service:    • ENDOSCOPY     • ENDOSCOPY N/A 2018    Procedure: ESOPHAGOGASTRODUODENOSCOPY WITH COLD BIOPSIES;  Surgeon: Jose De Jesus Cradona MD;  Location: [facility] ENDOS[facility];  Service:    • HERNIA REPAIR         FAMILY HISTORY  Family History   Problem Relation Age of Onset   • Heart disease Brother    • Diabetes Brother        SOCIAL HISTORY  Social History     Socioeconomic History   • Marital status:      Spouse name: Not on file   • Number of children: Not on file   • Years of education: Not on file   • Highest education level: Not on file   Social Needs   • Financial resource strain: Not on file   • Food insecurity - worry: Not on file   • Food insecurity - inability: Not on file   • Transportation needs - medical: Not on file   • Transportation needs - non-medical: Not on file   Occupational History   • Not on file   Tobacco Use   • Smoking status: Former Smoker     Packs/day: 2.00     Years: 50.00     Pack years: 100.00     Types: Cigarettes     Last attempt to quit: 2003     Years since quittin.0   • Smokeless tobacco: Never Used   Substance and Sexual Activity   • Alcohol use: Yes     Comment: Socially   • Drug use: No   • Sexual activity: Defer   Other Topics Concern   • Not on file   Social History Narrative   • Not on file       ALLERGIES  Buffered aspirin and Bactrim [sulfamethoxazole-trimethoprim]    REVIEW OF SYSTEMS  Review of Systems   Constitutional: Negative for activity change, appetite  change and fever.   HENT: Negative for congestion and sore throat.    Eyes: Negative.    Respiratory: Negative for cough and shortness of breath.    Cardiovascular: Positive for chest pain. Negative for leg swelling.   Gastrointestinal: Positive for abdominal pain. Negative for diarrhea and vomiting.   Endocrine: Negative.    Genitourinary: Negative for decreased urine volume and dysuria.   Musculoskeletal: Negative for neck pain.   Skin: Negative for rash and wound.   Allergic/Immunologic: Negative.    Neurological: Negative for weakness, numbness and headaches.   Hematological: Negative.    Psychiatric/Behavioral: Negative.    All other systems reviewed and are negative.      PHYSICAL EXAM  ED Triage Vitals   Temp Heart Rate Resp BP SpO2   01/23/19 0019 01/23/19 0019 01/23/19 0019 01/23/19 0025 01/23/19 0019   97.4 °F (36.3 °C) 76 16 (!) 190/96 96 %      Temp src Heart Rate Source Patient Position BP Location FiO2 (%)   01/23/19 0019 01/23/19 0019 01/23/19 0025 01/23/19 0025 --   Tympanic Monitor Lying Left arm        Physical Exam   Constitutional: He is oriented to person, place, and time. No distress.   HENT:   Head: Normocephalic and atraumatic.   Eyes: EOM are normal. Pupils are equal, round, and reactive to light.   Neck: Normal range of motion. Neck supple.   Cardiovascular: Normal rate, regular rhythm and normal heart sounds.   Pulmonary/Chest: Effort normal and breath sounds normal. No respiratory distress.   Abdominal: Soft. There is tenderness in the right upper quadrant. There is no rebound and no guarding.   Musculoskeletal: Normal range of motion. He exhibits no edema.   Neurological: He is alert and oriented to person, place, and time. He has normal sensation and normal strength.   Skin: Skin is warm and dry.   Psychiatric: Mood and affect normal.   Nursing note and vitals reviewed.      LAB RESULTS  Lab Results (last 24 hours)     Procedure Component Value Units Date/Time    CBC & Differential  [[id number]] Collected:  01/23/19 0035    Specimen:  Blood Updated:  01/23/19 0046    Narrative:       The following orders were created for panel order CBC & Differential.  Procedure                               Abnormality         Status                     ---------                               -----------         ------                     CBC Auto Differential[[id number]]        Abnormal            Final result                 Please view results for these tests on the individual orders.    Comprehensive Metabolic Panel [[id number]] Collected:  01/23/19 0035    Specimen:  Blood Updated:  01/23/19 0101     Glucose 94 mg/dL      BUN 21 mg/dL      Creatinine 1.04 mg/dL      Sodium 142 mmol/L      Potassium 3.8 mmol/L      Chloride 105 mmol/L      CO2 27.2 mmol/L      Calcium 9.3 mg/dL      Total Protein 6.5 g/dL      Albumin 4.00 g/dL      ALT (SGPT) 12 U/L      AST (SGOT) 10 U/L      Alkaline Phosphatase 71 U/L      Total Bilirubin 0.8 mg/dL      eGFR Non African Amer 68 mL/min/1.73      Globulin 2.5 gm/dL      A/G Ratio 1.6 g/dL      BUN/Creatinine Ratio 20.2     Anion Gap 9.8 mmol/L     Narrative:       The MDRD GFR formula is only valid for adults with stable renal function between ages 18 and 70.    Troponin [[id number]]  (Normal) Collected:  01/23/19 0035    Specimen:  Blood Updated:  01/23/19 0101     Troponin T <0.010 ng/mL     Narrative:       Troponin T Reference Ranges:  Less than 0.03 ng/mL:    Negative for AMI  0.03 to 0.09 ng/mL:      Indeterminant for AMI  Greater than 0.09 ng/mL: Positive for AMI    CBC Auto Differential [[id number]]  (Abnormal) Collected:  01/23/19 0035    Specimen:  Blood Updated:  01/23/19 0046     WBC 7.34 10*3/mm3      RBC 5.35 10*6/mm3      Hemoglobin 15.5 g/dL      Hematocrit 48.1 %      MCV 89.9 fL      MCH 29.0 pg      MCHC 32.2 g/dL      RDW 14.2 %      RDW-SD 46.6 fl      MPV 9.7 fL      Platelets 164 10*3/mm3      Neutrophil % 62.6 %      Lymphocyte % 26.0 %      Monocyte  % 9.0 %      Eosinophil % 2.0 %      Basophil % 0.1 %      Immature Grans % 0.3 %      Neutrophils, Absolute 4.59 10*3/mm3      Lymphocytes, Absolute 1.91 10*3/mm3      Monocytes, Absolute 0.66 10*3/mm3      Eosinophils, Absolute 0.15 10*3/mm3      Basophils, Absolute 0.01 10*3/mm3      Immature Grans, Absolute 0.02 10*3/mm3            I ordered the above labs and reviewed the results    RADIOLOGY  US Gallbladder   Final Result   No acute process identified.       This report was finalized on 1/23/2019 2:24 AM by Dr. Hortencia Lucio M.D.          XR Chest 2 View   Final Result   No acute findings.       This report was finalized on 1/23/2019 12:57 AM by Dr. Hortencia Lucio M.D.               I ordered the above noted radiological studies. Interpreted by radiologist.  Reviewed by me in PACS.     Procedures    EKG          EKG time: 00:23  Rhythm/Rate: NSR 68  P waves and PA: nml  QRS, axis: nml   ST and T waves: inverted T waves in inferior leads     Interpreted Contemporaneously by me, independently viewed  unchanged compared to prior 10/18/16      PROGRESS AND CONSULTS       00:30  CXR, CMP, CBC, troponin, and EKG ordered.     00:54  BP- (!) 190/96 HR- 76 Temp- 97.4 °F (36.3 °C) (Tympanic) O2 sat- 96%  Informed pt that the EKG shows no acute changes. Plan for labs, U/S gallbladder, and CXR. Pt understands and agrees with the plan, all questions answered.    02:46  BP- 159/84 HR- 66 Temp- 97.4 °F (36.3 °C) (Tympanic) O2 sat- 94%  Rechecked the patient who is in NAD and is resting comfortably. Informed pt that the CXR, EKG, U/S, and troponin are negative. Informed pt of the plan for d/c with referral to cardiology for f/u. Pt understands and agrees with the plan, all questions answered.    MEDICAL DECISION MAKING  Results were reviewed/discussed with the patient and they were also made aware of online access. Pt also made aware that some labs, such as cultures, will not be resulted during ER visit and follow  up with PMD is necessary.     MDM  Number of Diagnoses or Management Options  Atypical chest pain:   Upper abdominal pain:      Amount and/or Complexity of Data Reviewed  Clinical lab tests: ordered and reviewed (Troponin is <0.010)  Tests in the radiology section of CPT®: ordered and reviewed (CXr and U/S gallbladder show NAD)  Tests in the medicine section of CPT®: ordered and reviewed (See EKG procedure note. )  Decide to obtain previous medical records or to obtain history from someone other than the patient: yes  Review and summarize past medical records: yes (Pt was last seen in the ED on 10/181/6 s/t dizziness. Workup at that time was unremarkable, including negative CT head, CXR, and EKG. )    Patient Progress  Patient progress: stable         DIAGNOSIS  Final diagnoses:   Atypical chest pain   Upper abdominal pain       DISPOSITION  DISCHARGE    Patient discharged in stable condition.    Reviewed implications of results, diagnosis, meds, responsibility to follow up, warning signs and symptoms of possible worsening, potential complications and reasons to return to ER.    Patient/Family voiced understanding of above instructions.    Discussed plan for discharge, as there is no emergent indication for admission. Patient referred to primary care provider for BP management due to today's BP. Pt/family is agreeable and understands need for follow up and repeat testing.  Pt is aware that discharge does not mean that nothing is wrong but it indicates no emergency is present that requires admission and they must continue care with follow-up as given below or physician of their choice.     FOLLOW-UP  [facility] CARDIOLOGY  [address]  [facility] [address]  [phone]  Schedule an appointment as soon as possible for a visit            Medication List      No changes were made to your prescriptions during this visit.       Latest Documented Vital Signs:  As of 4:06  AM  BP- 168/99 HR- 58 Temp- 98.5 °F (36.9 °C) (Oral) O2 sat- 96%    --  Documentation assistance provided by nicolás Cortez for Dr Sandy.  Information recorded by the scribe was done at my direction and has been verified and validated by me.        Matilde Cortez  01/23/19 0248       Jake Sandy MD  01/23/19 2810

## 2019-03-15 ENCOUNTER — APPOINTMENT (OUTPATIENT)
Dept: MRI IMAGING | Facility: HOSPITAL | Age: 84
End: 2019-03-15

## 2019-03-15 ENCOUNTER — APPOINTMENT (OUTPATIENT)
Dept: CT IMAGING | Facility: HOSPITAL | Age: 84
End: 2019-03-15

## 2019-03-15 ENCOUNTER — HOSPITAL ENCOUNTER (OUTPATIENT)
Facility: HOSPITAL | Age: 84
Setting detail: OBSERVATION
Discharge: HOME | End: 2019-03-16
Attending: EMERGENCY MEDICINE | Admitting: INTERNAL MEDICINE

## 2019-03-15 DIAGNOSIS — G45.9 TIA (TRANSIENT ISCHEMIC ATTACK): ICD-10-CM

## 2019-03-15 DIAGNOSIS — R42 DIZZINESS: Primary | ICD-10-CM

## 2019-03-15 LAB
ALBUMIN SERPL-MCNC: 3.9 G/DL (ref 3.5–5.2)
ALBUMIN/GLOB SERPL: 1.6 G/DL
ALP SERPL-CCNC: 78 U/L (ref 39–117)
ALT SERPL W P-5'-P-CCNC: 12 U/L (ref 1–41)
ANION GAP SERPL CALCULATED.3IONS-SCNC: 11.5 MMOL/L
AST SERPL-CCNC: 10 U/L (ref 1–40)
BASOPHILS # BLD AUTO: 0.03 10*3/MM3 (ref 0–0.2)
BASOPHILS NFR BLD AUTO: 0.4 % (ref 0–1.5)
BILIRUB SERPL-MCNC: 1 MG/DL (ref 0.1–1.2)
BUN BLD-MCNC: 18 MG/DL (ref 8–23)
BUN/CREAT SERPL: 18.4 (ref 7–25)
CALCIUM SPEC-SCNC: 9.1 MG/DL (ref 8.6–10.5)
CHLORIDE SERPL-SCNC: 108 MMOL/L (ref 98–107)
CO2 SERPL-SCNC: 24.5 MMOL/L (ref 22–29)
CREAT BLD-MCNC: 0.98 MG/DL (ref 0.76–1.27)
DEPRECATED RDW RBC AUTO: 42.5 FL (ref 37–54)
EOSINOPHIL # BLD AUTO: 0.09 10*3/MM3 (ref 0–0.4)
EOSINOPHIL NFR BLD AUTO: 1.1 % (ref 0.3–6.2)
ERYTHROCYTE [DISTWIDTH] IN BLOOD BY AUTOMATED COUNT: 13.5 % (ref 12.3–15.4)
GFR SERPL CREATININE-BSD FRML MDRD: 73 ML/MIN/1.73
GLOBULIN UR ELPH-MCNC: 2.4 GM/DL
GLUCOSE BLD-MCNC: 108 MG/DL (ref 65–99)
GLUCOSE BLDC GLUCOMTR-MCNC: 87 MG/DL (ref 70–130)
HCT VFR BLD AUTO: 47.3 % (ref 37.5–51)
HGB BLD-MCNC: 15.2 G/DL (ref 13–17.7)
IMM GRANULOCYTES # BLD AUTO: 0.07 10*3/MM3 (ref 0–0.05)
IMM GRANULOCYTES NFR BLD AUTO: 0.8 % (ref 0–0.5)
LYMPHOCYTES # BLD AUTO: 1.43 10*3/MM3 (ref 0.7–3.1)
LYMPHOCYTES NFR BLD AUTO: 16.8 % (ref 19.6–45.3)
MCH RBC QN AUTO: 27.8 PG (ref 26.6–33)
MCHC RBC AUTO-ENTMCNC: 32.1 G/DL (ref 31.5–35.7)
MCV RBC AUTO: 86.5 FL (ref 79–97)
MONOCYTES # BLD AUTO: 0.49 10*3/MM3 (ref 0.1–0.9)
MONOCYTES NFR BLD AUTO: 5.8 % (ref 5–12)
NEUTROPHILS # BLD AUTO: 6.38 10*3/MM3 (ref 1.4–7)
NEUTROPHILS NFR BLD AUTO: 75.1 % (ref 42.7–76)
NRBC BLD AUTO-RTO: 0 /100 WBC (ref 0–0)
PLATELET # BLD AUTO: 210 10*3/MM3 (ref 140–450)
PMV BLD AUTO: 9.8 FL (ref 6–12)
POTASSIUM BLD-SCNC: 4.2 MMOL/L (ref 3.5–5.2)
PROT SERPL-MCNC: 6.3 G/DL (ref 6–8.5)
RBC # BLD AUTO: 5.47 10*6/MM3 (ref 4.14–5.8)
SODIUM BLD-SCNC: 144 MMOL/L (ref 136–145)
WBC NRBC COR # BLD: 8.49 10*3/MM3 (ref 3.4–10.8)

## 2019-03-15 PROCEDURE — 99284 EMERGENCY DEPT VISIT MOD MDM: CPT

## 2019-03-15 PROCEDURE — G0378 HOSPITAL OBSERVATION PER HR: HCPCS

## 2019-03-15 PROCEDURE — 80053 COMPREHEN METABOLIC PANEL: CPT | Performed by: EMERGENCY MEDICINE

## 2019-03-15 PROCEDURE — 85025 COMPLETE CBC W/AUTO DIFF WBC: CPT | Performed by: EMERGENCY MEDICINE

## 2019-03-15 PROCEDURE — 70549 MR ANGIOGRAPH NECK W/O&W/DYE: CPT

## 2019-03-15 PROCEDURE — 0 GADOBENATE DIMEGLUMINE 529 MG/ML SOLUTION: Performed by: INTERNAL MEDICINE

## 2019-03-15 PROCEDURE — 70553 MRI BRAIN STEM W/O & W/DYE: CPT

## 2019-03-15 PROCEDURE — 93005 ELECTROCARDIOGRAM TRACING: CPT | Performed by: EMERGENCY MEDICINE

## 2019-03-15 PROCEDURE — 70544 MR ANGIOGRAPHY HEAD W/O DYE: CPT

## 2019-03-15 PROCEDURE — 82962 GLUCOSE BLOOD TEST: CPT

## 2019-03-15 PROCEDURE — 93010 ELECTROCARDIOGRAM REPORT: CPT | Performed by: INTERNAL MEDICINE

## 2019-03-15 PROCEDURE — 70450 CT HEAD/BRAIN W/O DYE: CPT

## 2019-03-15 PROCEDURE — 99203 OFFICE O/P NEW LOW 30 MIN: CPT | Performed by: PSYCHIATRY & NEUROLOGY

## 2019-03-15 PROCEDURE — A9577 INJ MULTIHANCE: HCPCS | Performed by: INTERNAL MEDICINE

## 2019-03-15 RX ORDER — FINASTERIDE 1 MG/1
1 TABLET, FILM COATED ORAL DAILY
Status: DISCONTINUED | OUTPATIENT
Start: 2019-03-16 | End: 2019-03-16 | Stop reason: HOSPADM

## 2019-03-15 RX ORDER — HYDROCHLOROTHIAZIDE 25 MG/1
12.5 TABLET ORAL DAILY
Status: DISCONTINUED | OUTPATIENT
Start: 2019-03-15 | End: 2019-03-16 | Stop reason: HOSPADM

## 2019-03-15 RX ORDER — ACETAMINOPHEN 325 MG/1
650 TABLET ORAL EVERY 4 HOURS PRN
Status: DISCONTINUED | OUTPATIENT
Start: 2019-03-15 | End: 2019-03-16 | Stop reason: HOSPADM

## 2019-03-15 RX ORDER — SODIUM CHLORIDE 0.9 % (FLUSH) 0.9 %
3-10 SYRINGE (ML) INJECTION AS NEEDED
Status: DISCONTINUED | OUTPATIENT
Start: 2019-03-15 | End: 2019-03-16 | Stop reason: HOSPADM

## 2019-03-15 RX ORDER — LOSARTAN POTASSIUM 100 MG/1
100 TABLET ORAL DAILY
Status: DISCONTINUED | OUTPATIENT
Start: 2019-03-16 | End: 2019-03-16 | Stop reason: HOSPADM

## 2019-03-15 RX ORDER — ASPIRIN 325 MG
325 TABLET ORAL DAILY
Status: DISCONTINUED | OUTPATIENT
Start: 2019-03-16 | End: 2019-03-16

## 2019-03-15 RX ORDER — SODIUM CHLORIDE 0.9 % (FLUSH) 0.9 %
10 SYRINGE (ML) INJECTION AS NEEDED
Status: DISCONTINUED | OUTPATIENT
Start: 2019-03-15 | End: 2019-03-16 | Stop reason: HOSPADM

## 2019-03-15 RX ORDER — TAMSULOSIN HYDROCHLORIDE 0.4 MG/1
0.4 CAPSULE ORAL NIGHTLY
Status: DISCONTINUED | OUTPATIENT
Start: 2019-03-15 | End: 2019-03-16 | Stop reason: HOSPADM

## 2019-03-15 RX ORDER — ATORVASTATIN CALCIUM 80 MG/1
80 TABLET, FILM COATED ORAL NIGHTLY
Status: DISCONTINUED | OUTPATIENT
Start: 2019-03-15 | End: 2019-03-16

## 2019-03-15 RX ORDER — ONDANSETRON 2 MG/ML
4 INJECTION INTRAMUSCULAR; INTRAVENOUS EVERY 6 HOURS PRN
Status: DISCONTINUED | OUTPATIENT
Start: 2019-03-15 | End: 2019-03-16 | Stop reason: HOSPADM

## 2019-03-15 RX ORDER — ACETAMINOPHEN 325 MG/1
650 TABLET ORAL EVERY 4 HOURS PRN
Status: DISCONTINUED | OUTPATIENT
Start: 2019-03-15 | End: 2019-03-15

## 2019-03-15 RX ORDER — SODIUM CHLORIDE 0.9 % (FLUSH) 0.9 %
3 SYRINGE (ML) INJECTION EVERY 12 HOURS SCHEDULED
Status: DISCONTINUED | OUTPATIENT
Start: 2019-03-15 | End: 2019-03-16 | Stop reason: HOSPADM

## 2019-03-15 RX ORDER — ASPIRIN 300 MG/1
300 SUPPOSITORY RECTAL DAILY
Status: DISCONTINUED | OUTPATIENT
Start: 2019-03-16 | End: 2019-03-16

## 2019-03-15 RX ORDER — ASPIRIN 81 MG/1
324 TABLET, CHEWABLE ORAL ONCE
Status: COMPLETED | OUTPATIENT
Start: 2019-03-15 | End: 2019-03-15

## 2019-03-15 RX ORDER — ONDANSETRON 4 MG/1
4 TABLET, ORALLY DISINTEGRATING ORAL EVERY 6 HOURS PRN
Status: DISCONTINUED | OUTPATIENT
Start: 2019-03-15 | End: 2019-03-16 | Stop reason: HOSPADM

## 2019-03-15 RX ORDER — ACETAMINOPHEN 650 MG/1
650 SUPPOSITORY RECTAL EVERY 4 HOURS PRN
Status: DISCONTINUED | OUTPATIENT
Start: 2019-03-15 | End: 2019-03-16 | Stop reason: HOSPADM

## 2019-03-15 RX ORDER — ONDANSETRON 4 MG/1
4 TABLET, FILM COATED ORAL EVERY 6 HOURS PRN
Status: DISCONTINUED | OUTPATIENT
Start: 2019-03-15 | End: 2019-03-16 | Stop reason: HOSPADM

## 2019-03-15 RX ADMIN — TAMSULOSIN HYDROCHLORIDE 0.4 MG: 0.4 CAPSULE ORAL at 21:11

## 2019-03-15 RX ADMIN — SODIUM CHLORIDE, PRESERVATIVE FREE 3 ML: 5 INJECTION INTRAVENOUS at 21:11

## 2019-03-15 RX ADMIN — SODIUM CHLORIDE, PRESERVATIVE FREE 3 ML: 5 INJECTION INTRAVENOUS at 18:17

## 2019-03-15 RX ADMIN — ASPIRIN 324 MG: 81 TABLET, CHEWABLE ORAL at 08:36

## 2019-03-15 RX ADMIN — HYDROCHLOROTHIAZIDE 12.5 MG: 25 TABLET ORAL at 18:16

## 2019-03-15 RX ADMIN — GADOBENATE DIMEGLUMINE 17 ML: 529 INJECTION, SOLUTION INTRAVENOUS at 16:16

## 2019-03-15 RX ADMIN — ATORVASTATIN CALCIUM 80 MG: 80 TABLET, FILM COATED ORAL at 21:11

## 2019-03-15 NOTE — ED PROVIDER NOTES
" EMERGENCY DEPARTMENT ENCOUNTER    Room Number:  N534/1  Date seen:  3/15/2019  Time seen: 7:16 AM  PCP: Neda Parker MD  Historian: Pt      HPI:  Chief Complaint: Dizziness described as \"off-balance\"  Context: Akin Chaudhry is a 83 y.o. male who presents to the ED c/o dizziness described as \"off-balance\" starting last night at 2130 that was worse this morning. He reports when he was walking to his car to go to work this morning he felt he had an unsteady gait, but that this resolved on arrival to the ED. Pt reports he recently had a sore throat, cough and sinus congestion last week. Pt denies visual disturbances, trouble swallowing, difficulty speaking, fever, chills, V/D, HA, SOA, numbness, or tingling. Pt reports a hx of smoking, HTN and HLD. He denies a prior hx of these sx.     Quality: \"off-balance\"  Intensity/Severity: moderate  Duration: 10 hours  Onset quality: gradual  Timing: constant  Progression: worse this morning  Aggravating Factors: none stated  Alleviating Factors: none stated  Previous Episodes: Pt did not state a prior hx of these sx  Treatment before arrival: Pt did not state any treatment PTA  Associated Symptoms: sinus congestion, cough, sore throat, gait problem    PAST MEDICAL HISTORY  Active Ambulatory Problems     Diagnosis Date Noted   • Chronic coronary artery disease 02/03/2016   • Hyperlipidemia 02/03/2016   • Restless legs syndrome 02/03/2016   • Degeneration of intervertebral disc of lumbar region 10/13/2016   • Esophageal dysmotility 10/13/2016   • Gastroesophageal reflux disease 10/13/2016   • Hypertension 10/13/2016   • Chronic venous insufficiency 05/08/2017   • Benign prostatic hyperplasia with urinary frequency 12/13/2017   • Acid reflux 02/01/2018     Resolved Ambulatory Problems     Diagnosis Date Noted   • Acute gastritis 02/03/2016   • 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   • Urinary frequency 05/18/2016   • Acute abdominal pain 06/28/2016   • " Generalized abdominal pain 2016   • Bladder polyps 2016   • History of colonoscopy 10/13/2016   • 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   • Skin lesion 2017   • Acute prostatitis 2017   • Generalized abdominal pain 2018   • Change in bowel habits 2018   • Constipation 2018     Past Medical History:   Diagnosis Date   • Abdominal pain, suprapubic    • Acute upper respiratory infection    • Constipation    • Foot pain    • HLD (hyperlipidemia)    • Hypertension    • Limb pain    • Loss of hair    • Muscular aches    • Pain in soft tissues of limb    • Rash    • Rash          PAST SURGICAL HISTORY  Past Surgical History:   Procedure Laterality Date   • COLONOSCOPY     • COLONOSCOPY N/A 2018    Procedure: COLONOSCOPY TO CECUM WITH COLD POLYPECTOMY;  Surgeon: Jose De Jesus Cardona MD;  Location: [facility] ENDOSCOPY;  Service:    • ENDOSCOPY     • ENDOSCOPY N/A 2018    Procedure: ESOPHAGOGASTRODUODENOSCOPY WITH COLD BIOPSIES;  Surgeon: Jose De Jesus Cardona MD;  Location: [facility] ENDOSCOPY;  Service:    • HERNIA REPAIR           FAMILY HISTORY  Family History   Problem Relation Age of Onset   • Heart disease Brother    • Diabetes Brother          SOCIAL HISTORY  Social History     Socioeconomic History   • Marital status:      Spouse name: Not on file   • Number of children: Not on file   • Years of education: Not on file   • Highest education level: Not on file   Social Needs   • Financial resource strain: Not on file   • Food insecurity - worry: Not on file   • Food insecurity - inability: Not on file   • Transportation needs - medical: Not on file   • Transportation needs - non-medical: Not on file   Occupational History   • Not on file   Tobacco Use   • Smoking status: Former Smoker     Packs/day: 2.00     Years: 50.00     Pack years: 100.00     Types: Cigarettes     Last attempt to quit:      Years since quittin.2   • Smokeless tobacco: Never Used   Substance and Sexual Activity  "  • Alcohol use: Yes     Comment: Socially   • Drug use: No   • Sexual activity: Defer   Other Topics Concern   • Not on file   Social History Narrative   • Not on file         ALLERGIES  Buffered aspirin and Bactrim [sulfamethoxazole-trimethoprim]        REVIEW OF SYSTEMS  Review of Systems   Constitutional: Negative for diaphoresis and fever.   HENT: Positive for congestion and sore throat. Negative for trouble swallowing.    Eyes: Negative for visual disturbance.   Respiratory: Positive for cough. Negative for shortness of breath.    Cardiovascular: Negative for palpitations.   Gastrointestinal: Negative for blood in stool and vomiting.   Endocrine: Negative for polyuria.   Genitourinary: Negative for flank pain.   Musculoskeletal: Positive for gait problem. Negative for joint swelling.   Skin: Negative for wound.   Neurological: Positive for dizziness (\"off-balance\"). Negative for seizures, speech difficulty and light-headedness.   Hematological: Negative for adenopathy.   Psychiatric/Behavioral: Negative for sleep disturbance.            PHYSICAL EXAM  ED Triage Vitals [03/15/19 0702]   Temp Heart Rate Resp BP SpO2   97.1 °F (36.2 °C) 73 16 -- 96 %      Temp src Heart Rate Source Patient Position BP Location FiO2 (%)   Tympanic Monitor -- -- --         GENERAL: no acute distress  HENT: nares patent, oropharynx clear and moist  EYES: no scleral icterus  CV: regular rhythm, normal rate  RESPIRATORY: normal effort  ABDOMEN: soft  MUSCULOSKELETAL: no deformity  NEURO: alert, moves all extremities, follows commands,  Recent and remote memory functions are normal. The patient is attentive with normal concentration. Language is fluent. Speech is clear, non-dysarthric. Fund of knowledge is normal.   Symmetric smile with no facial droop.  Eyes close and shut strongly bilaterally.  Symmetric eyebrow raise bilaterally.  EOMI, PERRL  CN II-XII grossly normal otherwise.  5/5 strength to bilateral upper and lower " extremities.  Normal sensation to light touch in BUE and BLE.  No pronator drift.  Intact FNF.  Steady normal gait without assistance, but did have one mild stumble.  SKIN: warm, dry    Vital signs and nursing notes reviewed.          LAB RESULTS  Recent Results (from the past 24 hour(s))   Comprehensive Metabolic Panel    Collection Time: 03/15/19  7:29 AM   Result Value Ref Range    Glucose 108 (H) 65 - 99 mg/dL    BUN 18 8 - 23 mg/dL    Creatinine 0.98 0.76 - 1.27 mg/dL    Sodium 144 136 - 145 mmol/L    Potassium 4.2 3.5 - 5.2 mmol/L    Chloride 108 (H) 98 - 107 mmol/L    CO2 24.5 22.0 - 29.0 mmol/L    Calcium 9.1 8.6 - 10.5 mg/dL    Total Protein 6.3 6.0 - 8.5 g/dL    Albumin 3.90 3.50 - 5.20 g/dL    ALT (SGPT) 12 1 - 41 U/L    AST (SGOT) 10 1 - 40 U/L    Alkaline Phosphatase 78 39 - 117 U/L    Total Bilirubin 1.0 0.1 - 1.2 mg/dL    eGFR Non African Amer 73 >60 mL/min/1.73    Globulin 2.4 gm/dL    A/G Ratio 1.6 g/dL    BUN/Creatinine Ratio 18.4 7.0 - 25.0    Anion Gap 11.5 mmol/L   CBC Auto Differential    Collection Time: 03/15/19  7:29 AM   Result Value Ref Range    WBC 8.49 3.40 - 10.80 10*3/mm3    RBC 5.47 4.14 - 5.80 10*6/mm3    Hemoglobin 15.2 13.0 - 17.7 g/dL    Hematocrit 47.3 37.5 - 51.0 %    MCV 86.5 79.0 - 97.0 fL    MCH 27.8 26.6 - 33.0 pg    MCHC 32.1 31.5 - 35.7 g/dL    RDW 13.5 12.3 - 15.4 %    RDW-SD 42.5 37.0 - 54.0 fl    MPV 9.8 6.0 - 12.0 fL    Platelets 210 140 - 450 10*3/mm3    Neutrophil % 75.1 42.7 - 76.0 %    Lymphocyte % 16.8 (L) 19.6 - 45.3 %    Monocyte % 5.8 5.0 - 12.0 %    Eosinophil % 1.1 0.3 - 6.2 %    Basophil % 0.4 0.0 - 1.5 %    Immature Grans % 0.8 (H) 0.0 - 0.5 %    Neutrophils, Absolute 6.38 1.40 - 7.00 10*3/mm3    Lymphocytes, Absolute 1.43 0.70 - 3.10 10*3/mm3    Monocytes, Absolute 0.49 0.10 - 0.90 10*3/mm3    Eosinophils, Absolute 0.09 0.00 - 0.40 10*3/mm3    Basophils, Absolute 0.03 0.00 - 0.20 10*3/mm3    Immature Grans, Absolute 0.07 (H) 0.00 - 0.05 10*3/mm3    nRBC  0.0 0.0 - 0.0 /100 WBC   POC Glucose Once    Collection Time: 03/15/19 10:08 AM   Result Value Ref Range    Glucose 87 70 - 130 mg/dL       Ordered the above labs and reviewed the results.        RADIOLOGY  Ct Head Without Contrast    Result Date: 3/15/2019  CT SCAN OF THE HEAD WITHOUT CONTRAST  CLINICAL HISTORY: Dizziness.  TECHNIQUE: CT scan of the head was obtained with 3 mm axial images. No intravenous contrast was administered.  FINDINGS:  The ventricles, sulci, and cisterns are age appropriate. The basal ganglia and thalami are unremarkable in appearance. The posterior fossa structures are within normal limits.  Incidentally noted is mucosal thickening within the left maxillary sinus. 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       No evidence for acute intracranial pathology. However, if there continues to be clinical concern for a CT occult infarct, one could obtain an MRI of the brain for more sensitive and specific evaluation. This especially holds true for pathology within the posterior fossa.  The findings and recommendations were discussed with Dr. Mooney on 03/15/2019 at approximately 8:20 AM.  Radiation dose reduction techniques were utilized, including automated exposure control and exposure modulation based on body size.          CT head - negative acute per Dr. Steward    Ordered the above noted radiological studies. Reviewed by me in PACS.  Spoke with Dr. Steward (radiologist) regarding CT head scan results.          PROCEDURES  Procedures        EKG:           EKG time: 0733  Rhythm/Rate: nsr, 65  P waves and MS: 1st degree AV block  QRS, axis: borderline LAD   ST and T waves: T wave inversion in III and aVF     Interpreted Contemporaneously by me, independently viewed  unchanged compared to prior 1/23/19        MEDICATIONS GIVEN IN ER  Medications   sodium chloride 0.9 % flush 10 mL (not administered)    sodium chloride 0.9 % flush 3 mL (not administered)   sodium chloride 0.9 % flush 3-10 mL (not administered)   atorvastatin (LIPITOR) tablet 80 mg (not administered)   acetaminophen (TYLENOL) tablet 650 mg (not administered)     Or   acetaminophen (TYLENOL) suppository 650 mg (not administered)   aspirin tablet 325 mg (not administered)     Or   aspirin suppository 300 mg (not administered)   ondansetron (ZOFRAN) tablet 4 mg (not administered)     Or   ondansetron ODT (ZOFRAN-ODT) disintegrating tablet 4 mg (not administered)     Or   ondansetron (ZOFRAN) injection 4 mg (not administered)   hydrochlorothiazide (HYDRODIURIL) tablet 12.5 mg (not administered)   finasteride (PROPECIA) tablet 1 mg (not administered)   losartan (COZAAR) tablet 100 mg (not administered)   tamsulosin (FLOMAX) 24 hr capsule 0.4 mg (not administered)   aspirin chewable tablet 324 mg (324 mg Oral Given 3/15/19 0836)         PROGRESS AND CONSULTS     Pt is not a tPA candidate because his last known normal was at 2130 last night, and his sx have spontaneously resolved    0725 - Discussed pt care with Dr. Cazares (Stroke Neurology) who agrees with the plan to acquire a CT head, and if negative provide ASA and admit for further TIA work up.      0727 - Lab work, EKG, and CT head ordered for further evaluation.     0802 - Call placed with [facility]. ASA ordered.     0825 - Discussed pt care with Dr. Lloyd ([facility]) who agrees to admit the pt.     0832 - Rechecked pt. Pt is resting comfortably in NAD. Informed pt of the results of his lab work and head CT which were unremarkable, but that his sx are concerning for a TIA. Stated the plan to provide ASA and admit for further work up. Pt understands and agrees with plan. All questions answered.       MEDICAL DECISION MAKING    83-year-old male presents for episode of gait instability last night and this morning.  His symptoms have now spontaneously resolved.  CT brain is negative acute.  He is not a  candidate for TPA due to resolution of symptoms prior to arrival.  I am concerned that his symptoms represent a TIA and I have recommended admission for further evaluation.    MDM  Number of Diagnoses or Management Options     Amount and/or Complexity of Data Reviewed  Clinical lab tests: ordered and reviewed (WBC - 8.49  Hemoglobin - 15.2)  Tests in the radiology section of CPT®: ordered and reviewed (CT head - negative acute)  Tests in the medicine section of CPT®: ordered and reviewed (See EKG procedure note.)  Discussion of test results with the performing providers: yes (Dr. Steward)  Discuss the patient with other providers: yes (Dr. Cazares (Stroke Neurology)  Dr. Lloyd ([facility]))  Independent visualization of images, tracings, or specimens: yes (CT head - no ICH)               DIAGNOSIS  Final diagnoses:   Dizziness   TIA (transient ischemic attack)         DISPOSITION  ADMISSION    Discussed treatment plan and reason for admission with pt/family and admitting physician.  Pt/family voiced understanding of the plan for admission for further testing/treatment as needed.     Latest Documented Vital Signs:  As of 3:08 PM  BP- (!) 184/97 HR- 64 Temp- 97.7 °F (36.5 °C) (Oral) O2 sat- 96%        --  Documentation assistance provided by nicolás Valladares for Dr. CASEY Mooney MD.  Information recorded by the scribe was done at my direction and has been verified and validated by me.       Bryan Valladares  03/15/19 1721       Akash Mooney MD  03/15/19 3923

## 2019-03-15 NOTE — PROGRESS NOTES
Discharge Planning Assessment  [facility]     Patient Name: Akin Chaudhry  MRN: [id number]  Today's Date: 3/15/2019    Admit Date: 3/15/2019    Discharge Needs Assessment     Row Name 03/15/19 0943       Living Environment    Lives With  child(eloise), adult;spouse;grandchild(eloise)    Name(s) of Who Lives With Patient  Fernanda- spouse and extended family    Current Living Arrangements  home/apartment/condo    Duration at Residence  8-10 years    Primary Care Provided by  self    Provides Primary Care For  no one, unable/limited ability to care for self    Family Caregiver if Needed  none    Quality of Family Relationships  involved;supportive    Able to Return to Prior Arrangements  yes       Resource/Environmental Concerns    Resource/Environmental Concerns  none    Transportation Concerns  car, none       Transition Planning    Patient/Family Anticipates Transition to  home with family    Patient/Family Anticipated Services at Transition  none    Transportation Anticipated  family or friend will provide       Discharge Needs Assessment    Concerns to be Addressed  no discharge needs identified    Concerns Comments  No d/c needs identified at this time    Equipment Currently Used at Home  none    Anticipated Changes Related to Illness  none    Equipment Needed After Discharge  none    Offered/Gave Vendor List  no    Discharge Coordination/Progress  Patient has no anticipated needs at this time        Discharge Plan     Row Name 03/15/19 0946       Plan    Patient/Family in Agreement with Plan  yes    Plan Comments  Patient anticipates home w/family upon d/c- family to provide transportation home        Destination      No service coordination in this encounter.      Durable Medical Equipment      No service coordination in this encounter.      Dialysis/Infusion      No service coordination in this encounter.      Home Medical Care      No service coordination in this encounter.      Community Resources      No  service coordination in this encounter.          Demographic Summary     Row Name 03/15/19 0913       General Information    Admission Type  observation    Arrived From  home    Referral Source  other (see comments)    Reason for Consult  discharge planning    Preferred Language  English     Used During This Interaction  no    General Information Comments  Introduced self and explained role. Information on facesheet correct       Contact Information    Permission Granted to Share Info With      Contact Information Obtained for      Contact Information Comments  Spouse Fernanda information on facesheet- grandson in room at bedside        Functional Status     Row Name 03/15/19 0974       Functional Status    Usual Activity Tolerance  good    Current Activity Tolerance  good    Functional Status Comments  Independent w/ADL's -still drives       Functional Status, IADL    Medications  independent    Meal Preparation  independent    Housekeeping  independent;assistive person    Laundry  independent    Shopping  independent    IADL Comments  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       Abuse/Neglect    No documentation.       Legal    No documentation.       Substance Abuse    No documentation.       Patient Forms    No documentation.           Yamila Hoffmann RN

## 2019-03-15 NOTE — CONSULTS
Neurology Note    Patient:  Akin Chaudhry    YOB: 1935    REFERRING PHYSICIAN:  No ref. provider found    CHIEF COMPLAINT:    Loss of balance    HISTORY OF PRESENT ILLNESS:   The patient is a 83 y.o. male with HTN, HLP on low dose aspirin and Lipitor, presented to ED with dizziness and loss of balance this am. His symptoms started last night, dizziness at times severe. He was having difficulty with gait, some nausea. He has had recent sinus congestion/URI.  Denies focal numbness, weakness, vision changes. Presently, he is feeling better.    Past Medical History:  Past Medical History:   Diagnosis Date   • Abdominal pain, suprapubic    • Acute upper respiratory infection    • Constipation    • Foot pain    • HLD (hyperlipidemia)    • Hypertension    • Limb pain    • Loss of hair    • Muscular aches    • Pain in soft tissues of limb    • Rash    • Rash        Past Surgical History:  Past Surgical History:   Procedure Laterality Date   • COLONOSCOPY     • COLONOSCOPY N/A 2018    Procedure: COLONOSCOPY TO CECUM WITH COLD POLYPECTOMY;  Surgeon: Jose De Jesus Cardona MD;  Location: [facility] ENDOSCOPY;  Service:    • ENDOSCOPY     • ENDOSCOPY N/A 2018    Procedure: ESOPHAGOGASTRODUODENOSCOPY WITH COLD BIOPSIES;  Surgeon: Jose De Jesus Cardona MD;  Location: [facility] ENDOSCOPY;  Service:    • HERNIA REPAIR         Social History:   Social History     Socioeconomic History   • Marital status:      Spouse name: Not on file   • Number of children: Not on file   • Years of education: Not on file   • Highest education level: Not on file   Tobacco Use   • Smoking status: Former Smoker     Packs/day: 2.00     Years: 50.00     Pack years: 100.00     Types: Cigarettes     Last attempt to quit:      Years since quittin.2   • Smokeless tobacco: Never Used   Substance and Sexual Activity   • Alcohol use: Yes     Comment: Socially   • Drug use: No   • Sexual activity: Defer        Family History:   Family  History   Problem Relation Age of Onset   • Heart disease Brother    • Diabetes Brother        Medications Prior to Admission:    Prior to Admission medications    Medication Sig Start Date End Date Taking? Authorizing Provider   aspirin 81 MG tablet Take 81 mg by mouth daily.   Yes Leandra Harley MD   atorvastatin (LIPITOR) 40 MG tablet Take 1 tablet by mouth Daily. 10/23/18  Yes Nickolas Watson MD   finasteride (PROPECIA) 1 MG tablet TAKE ONE TABLET BY MOUTH ONCE DAILY 6/25/18  Yes Obed Dominguez Jr., MD   furosemide (LASIX) 20 MG tablet Take one half tablet by mouth daily. 5/30/18  Yes Obed Dominguez Jr., MD   losartan (COZAAR) 100 MG tablet TAKE 1 TABLET EVERY DAY 6/1/18  Yes Obed Dominguez Jr., MD   potassium chloride (K-DUR,KLOR-CON) 20 MEQ CR tablet TAKE ONE TABLET BY MOUTH ONCE DAILY 8/8/18  Yes Obed Dominguez Jr., MD   tamsulosin (FLOMAX) 0.4 MG capsule 24 hr capsule Take 1 capsule by mouth Every Night.   Yes ProviderLeandra MD   hydrochlorothiazide (HYDRODIURIL) 12.5 MG tablet Take 1 tablet by mouth Daily. 5/23/18   Obed Dominguez Jr., MD       Allergies:  Buffered aspirin and Bactrim [sulfamethoxazole-trimethoprim]      Review of system  Review of Systems   Constitutional: Positive for activity change.   HENT: Positive for congestion, sinus pressure, sore throat and trouble swallowing.    Musculoskeletal: Positive for gait problem.   Neurological: Positive for dizziness.   All other systems reviewed and are negative.      Vitals:    03/15/19 1003   BP: (!) 184/97   Pulse: 64   Resp: 20   Temp: 97.7 °F (36.5 °C)   SpO2: 96%       Physical exam  Physical Exam   Constitutional: He is oriented to person, place, and time. He appears well-developed and well-nourished.   Cardiovascular: Normal rate and regular rhythm.   Pulmonary/Chest: Effort normal.   Neurological: He is alert and oriented to person, place, and time. He has normal strength and normal reflexes. He displays normal reflexes.  No cranial nerve deficit or sensory deficit. He exhibits normal muscle tone. Coordination normal.   Psychiatric: He has a normal mood and affect. His behavior is normal. Thought content normal.         Lab Results   Component Value Date    WBC 8.49 03/15/2019    HGB 15.2 03/15/2019    HCT 47.3 03/15/2019    MCV 86.5 03/15/2019     03/15/2019     Lab Results   Component Value Date    GLUCOSE 108 (H) 03/15/2019    BUN 18 03/15/2019    CREATININE 0.98 03/15/2019    EGFRIFNONA 73 03/15/2019    EGFRIFAFRI 86 04/18/2018    BCR 18.4 03/15/2019    CO2 24.5 03/15/2019    CALCIUM 9.1 03/15/2019    PROTENTOTREF 6.5 04/18/2018    ALBUMIN 3.90 03/15/2019    LABIL2 2.4 04/18/2018    AST 10 03/15/2019    ALT 12 03/15/2019     ECG 12 Lead   Order: [id number]   Status:  Final result   Visible to patient:  No (Not Released)   Next appt:  None      Narrative     HEART RATE= 65  bpm  RR Interval= 928  ms  VA Interval= 226  ms  P Horizontal Axis= 27  deg  P Front Axis= -1  deg  QRSD Interval= 100  ms  QT Interval= 405  ms  QRS Axis= -26  deg  T Wave Axis= -27  deg  - ABNORMAL ECG -  Sinus rhythm  Prolonged VA interval  Inferior infarct, old  Consider anterior infarct  compared with prior ekg, PACs have resolved  Electronically Signed By: Amara Moore ([facility]) 15-Mar-2019 15:39:07  Date and Time of Study: 2019-03-15 07:33:18      Specimen Collected: 03/15/19 07:33 Last Resulted: 03/15/19 15:39               Radiological Studies:    Ct Head Without Contrast    Result Date: 3/15/2019  CT SCAN OF THE HEAD WITHOUT CONTRAST  CLINICAL HISTORY: Dizziness.  TECHNIQUE: CT scan of the head was obtained with 3 mm axial images. No intravenous contrast was administered.  FINDINGS:  The ventricles, sulci, and cisterns are age appropriate. The basal ganglia and thalami are unremarkable in appearance. The posterior fossa structures are within normal limits.  Incidentally noted is mucosal thickening within the left maxillary sinus. 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       No evidence for acute intracranial pathology. However, if there continues to be clinical concern for a CT occult infarct, one could obtain an MRI of the brain for more sensitive and specific evaluation. This especially holds true for pathology within the posterior fossa.  The findings and recommendations were discussed with Dr. Mooney on 03/15/2019 at approximately 8:20 AM.  Radiation dose reduction techniques were utilized, including automated exposure control and exposure modulation based on body size.           During this visit the following were done:  Labs Reviewed [x]    Labs Ordered []    Radiology Reports Reviewed [x]    Radiology Ordered []    EKG, echo, and/or stress test reviewed [x]    EEG results reviewed  []    EEG reviewed and interpreted per myself   []    Discussed case with neurointerventionalist or neuroradiologist []    Referring Provider Records Reviewed []    ER Records Reviewed [x]    Hospital Records Reviewed []    History Obtained From Family []    Radiological images view and Interpreted per myself [x]    Case Discussed with referring provider [x]     Decision to obtain and request outside records  []        Assessment and Plan     Dizziness/vertigo, favor peripheral vestibular cause, e.g. labyrinthitis over a cerebrovascular event. MRI/MRAs negative to my eye, official report pending.    - Observation on telemetry.    - F/U MRI/MRA report.    - TTE.    - Continue aspirin and statin.    Thanks,              Electronically signed by Edmund Cazares MD on 3/15/2019 at 4:51 PM

## 2019-03-15 NOTE — ED TRIAGE NOTES
To ER via PV.  Pt states when he went to bed last night he was a little lightheaded.  States when he awoke this am lightheadedness is worse, with dizziness and nausea.

## 2019-03-15 NOTE — H&P
Patient Name:  Akin Chaudhry  YOB: 1935  MRN:  [id number]  Admit Date:  3/15/2019  Patient Care Team:  Neda Parker MD as PCP - General (Internal Medicine)      Subjective   History Present Illness     Chief Complaint   Patient presents with   • Dizziness   • Nausea       Mr. Chaudhry is a 83 y.o. former smoker with a history of hypertension, hyperlipidemia, BPH who came to the hospital for lightheadedness and dizziness.  He was admitted to our service for stroke workup.  A few weeks ago he had upper respiratory infection and he is still recovering.  He still has a sore throat but denies any fevers or chills.  Last night he started getting lightheaded.  Reports dizziness not worse on standing or movement of his head.  This morning he got into his truck to back it up when he started having severe dizziness.  He could not even move the car.  He also had ataxic gait and could not walk a straight line.  Therefore, he came to the emergency room.  CT head was reportedly negative.  Neurology was consulted and they recommended admission for stroke workup.    History of Present Illness  Review of Systems   Constitutional: Positive for activity change. Negative for appetite change, fatigue, fever and unexpected weight change.   HENT: Positive for congestion, sore throat and trouble swallowing. Negative for nosebleeds.    Eyes: Negative for pain and visual disturbance.   Respiratory: Negative for cough, chest tightness, shortness of breath and wheezing.    Cardiovascular: Negative for chest pain, palpitations and leg swelling.   Gastrointestinal: Positive for nausea. Negative for abdominal pain, constipation, diarrhea and vomiting.   Endocrine:        Negative for Diabetes or thyroid disease   Genitourinary: Negative for difficulty urinating and hematuria.   Musculoskeletal: Positive for gait problem. Negative for back pain, neck pain and neck stiffness.   Skin: Negative for rash and wound.    Neurological: Positive for dizziness and light-headedness. Negative for syncope, weakness and headaches.   Hematological: Negative for adenopathy. Does not bruise/bleed easily.   Psychiatric/Behavioral: Negative for agitation, behavioral problems and confusion.        Personal History     Past Medical History:   Diagnosis Date   • Abdominal pain, suprapubic    • Acute upper respiratory infection    • Constipation    • Foot pain    • HLD (hyperlipidemia)    • Hypertension    • Limb pain    • Loss of hair    • Muscular aches    • Pain in soft tissues of limb    • Rash    • Rash      Past Surgical History:   Procedure Laterality Date   • COLONOSCOPY     • COLONOSCOPY N/A 2018    Procedure: COLONOSCOPY TO CECUM WITH COLD POLYPECTOMY;  Surgeon: Jose De Jesus Cardona MD;  Location: [facility] ENDOSCOPY;  Service:    • ENDOSCOPY     • ENDOSCOPY N/A 2018    Procedure: ESOPHAGOGASTRODUODENOSCOPY WITH COLD BIOPSIES;  Surgeon: Jose De Jesus Cardona MD;  Location: [facility] ENDOSCOPY;  Service:    • HERNIA REPAIR       Family History   Problem Relation Age of Onset   • Heart disease Brother    • Diabetes Brother      Social History     Tobacco Use   • Smoking status: Former Smoker     Packs/day: 2.00     Years: 50.00     Pack years: 100.00     Types: Cigarettes     Last attempt to quit:      Years since quittin.2   • Smokeless tobacco: Never Used   Substance Use Topics   • Alcohol use: Yes     Comment: Socially   • Drug use: No       (Not in a hospital admission)  Allergies:    Allergies   Allergen Reactions   • Buffered Aspirin Other (See Comments)     Upset stomach   • Bactrim [Sulfamethoxazole-Trimethoprim] Rash       Objective    Objective     Vital Signs  Temp:  [97.1 °F (36.2 °C)] 97.1 °F (36.2 °C)  Heart Rate:  [61-73] 61  Resp:  [16] 16  BP: (149-192)/() 149/96  SpO2:  [93 %-96 %] 93 %  on   ;   Device (Oxygen Therapy): room air  Body mass index is 27.63 kg/m².    Physical Exam   Constitutional: He is oriented  to person, place, and time. He appears well-developed and well-nourished. No distress.   HENT:   Head: Normocephalic and atraumatic.   Right Ear: Tympanic membrane normal.   Left Ear: Tympanic membrane normal.   Mouth/Throat: Oropharynx is clear and moist. No oropharyngeal exudate.   Eyes: Conjunctivae and EOM are normal. Pupils are equal, round, and reactive to light. No scleral icterus.   Neck: Normal range of motion. Neck supple. No JVD present. No thyromegaly present.   Cardiovascular: Normal rate, regular rhythm and normal heart sounds. Exam reveals no gallop and no friction rub.   No murmur heard.  Pulmonary/Chest: Effort normal and breath sounds normal. No respiratory distress. He has no wheezes. He has no rales.   Abdominal: Soft. Bowel sounds are normal. He exhibits no distension. There is no tenderness. There is no rebound and no guarding.   Musculoskeletal: He exhibits no edema or tenderness.   Lymphadenopathy:     He has no cervical adenopathy.   Neurological: He is alert and oriented to person, place, and time. He has normal strength. He is not disoriented. He displays no tremor. No cranial nerve deficit. GCS eye subscore is 4. GCS verbal subscore is 5. GCS motor subscore is 6.   No nystagmus   Skin: Skin is warm and dry. He is not diaphoretic.   Psychiatric: He has a normal mood and affect. His behavior is normal.   Vitals reviewed.      Results Review:  I reviewed the patient's new clinical results.  I reviewed the patient's new imaging results and agree with the interpretation.  I reviewed the patient's other test results and agree with the interpretation  I personally viewed and interpreted the patient's EKG/Telemetry data  Discussed with ED provider.      Lab Results (last 24 hours)     Procedure Component Value Units Date/Time    CBC & Differential [[id number]] Collected:  03/15/19 0729    Specimen:  Blood Updated:  03/15/19 0742    Narrative:       The following orders were created for panel order  CBC & Differential.  Procedure                               Abnormality         Status                     ---------                               -----------         ------                     CBC Auto Differential[[id number]]        Abnormal            Final result                 Please view results for these tests on the individual orders.    Comprehensive Metabolic Panel [[id number]]  (Abnormal) Collected:  03/15/19 0729    Specimen:  Blood Updated:  03/15/19 0800     Glucose 108 mg/dL      BUN 18 mg/dL      Creatinine 0.98 mg/dL      Sodium 144 mmol/L      Potassium 4.2 mmol/L      Chloride 108 mmol/L      CO2 24.5 mmol/L      Calcium 9.1 mg/dL      Total Protein 6.3 g/dL      Albumin 3.90 g/dL      ALT (SGPT) 12 U/L      AST (SGOT) 10 U/L      Alkaline Phosphatase 78 U/L      Total Bilirubin 1.0 mg/dL      eGFR Non African Amer 73 mL/min/1.73      Globulin 2.4 gm/dL      A/G Ratio 1.6 g/dL      BUN/Creatinine Ratio 18.4     Anion Gap 11.5 mmol/L     Narrative:       GFR Normal >60  Chronic Kidney Disease <60  Kidney Failure <15    CBC Auto Differential [[id number]]  (Abnormal) Collected:  03/15/19 0729    Specimen:  Blood Updated:  03/15/19 0742     WBC 8.49 10*3/mm3      RBC 5.47 10*6/mm3      Hemoglobin 15.2 g/dL      Hematocrit 47.3 %      MCV 86.5 fL      MCH 27.8 pg      MCHC 32.1 g/dL      RDW 13.5 %      RDW-SD 42.5 fl      MPV 9.8 fL      Platelets 210 10*3/mm3      Neutrophil % 75.1 %      Lymphocyte % 16.8 %      Monocyte % 5.8 %      Eosinophil % 1.1 %      Basophil % 0.4 %      Immature Grans % 0.8 %      Neutrophils, Absolute 6.38 10*3/mm3      Lymphocytes, Absolute 1.43 10*3/mm3      Monocytes, Absolute 0.49 10*3/mm3      Eosinophils, Absolute 0.09 10*3/mm3      Basophils, Absolute 0.03 10*3/mm3      Immature Grans, Absolute 0.07 10*3/mm3      nRBC 0.0 /100 WBC           Imaging Results (last 24 hours)     Procedure Component Value Units Date/Time    CT Head Without Contrast [[id number]] Collected:   03/15/19 0901     Updated:  03/15/19 0901    Narrative:       CT SCAN OF THE HEAD WITHOUT CONTRAST     CLINICAL HISTORY: Dizziness.     TECHNIQUE: CT scan of the head was obtained with 3 mm axial images. No  intravenous contrast was administered.     FINDINGS:     The ventricles, sulci, and cisterns are age appropriate. The basal  ganglia and thalami are unremarkable in appearance. The posterior fossa  structures are within normal limits.     Incidentally noted is mucosal thickening within the left maxillary  sinus. Incidentally noted is a lipoma within the right suboccipital soft  tissues measuring up to 2.1 x 1.2 cm in greatest axial dimensions.       Impression:          No evidence for acute intracranial pathology. However, if there  continues to be clinical concern for a CT occult infarct, one could  obtain an MRI of the brain for more sensitive and specific evaluation.  This especially holds true for pathology within the posterior fossa.     The findings and recommendations were discussed with Dr. Mooney on  03/15/2019 at approximately 8:20 AM.     Radiation dose reduction techniques were utilized, including automated  exposure control and exposure modulation based on body size.                     ECG 12 Lead   Preliminary Result   HEART RATE= 65  bpm   RR Interval= 928  ms   GA Interval= 226  ms   P Horizontal Axis= 27  deg   P Front Axis= -1  deg   QRSD Interval= 100  ms   QT Interval= 405  ms   QRS Axis= -26  deg   T Wave Axis= -27  deg   - ABNORMAL ECG -   Sinus rhythm   Prolonged GA interval   Inferior infarct, old   Consider anterior infarct   Electronically Signed By:    Date and Time of Study: 2019-03-15 07:33:18           Assessment/Plan     Active Hospital Problems    Diagnosis Date Noted   • **Dizziness [R42] 03/15/2019   • Benign prostatic hyperplasia with urinary frequency [N40.1, R35.0] 12/13/2017   • Hypertension [I10] 10/13/2016   • Hyperlipidemia [E78.5] 02/03/2016   • Chronic coronary  artery disease [I25.10] 02/03/2016     Overview:   60% LAD         Mr. Chaudhry is a 83 y.o. former smoker with a history of hypertension and hyperlipidemia who presented for dizziness.  We will admit him for stroke workup.  Continue his home medications once they are reviewed.  Check MRI/MRA, 2D echo, physical therapy occupational therapy and speech therapy.  I do not believe he had a TIA or stroke.  Symptoms seem more related to either vestibular neuritis or labyrinthitis related to his recent upper respiratory infection.  Will consult the stroke team for their expertise.  I suspect he will be L to discharge home tomorrow.      · I discussed the patients findings and my recommendations with patient and nursing staff.    VTE Prophylaxis - SCDs .  Code Status - Full code.       Roshan Lloyd MD  [facility]  03/15/19  9:05 AM

## 2019-03-15 NOTE — SIGNIFICANT NOTE
03/15/19 1500   Rehab Time/Intention   Evaluation Not Performed patient unavailable for evaluation   Rehab Treatment   Discipline speech language pathologist   Reason Treatment Not Performed other (see comments)  (Pt off floor, ST to follow for bedside swallow eval as warranted.)

## 2019-03-16 ENCOUNTER — APPOINTMENT (OUTPATIENT)
Dept: CARDIOLOGY | Facility: HOSPITAL | Age: 84
End: 2019-03-16

## 2019-03-16 VITALS
TEMPERATURE: 97.8 F | SYSTOLIC BLOOD PRESSURE: 135 MMHG | RESPIRATION RATE: 16 BRPM | HEIGHT: 68 IN | BODY MASS INDEX: 26.98 KG/M2 | OXYGEN SATURATION: 97 % | HEART RATE: 75 BPM | DIASTOLIC BLOOD PRESSURE: 81 MMHG | WEIGHT: 178 LBS

## 2019-03-16 PROBLEM — H81.20 VESTIBULAR NEURITIS: Status: ACTIVE | Noted: 2019-03-16

## 2019-03-16 LAB
BH CV ECHO MEAS - ACS: 2.4 CM
BH CV ECHO MEAS - AI DEC SLOPE: 104.3 CM/SEC^2
BH CV ECHO MEAS - AI MAX PG: 34.3 MMHG
BH CV ECHO MEAS - AI MAX VEL: 293 CM/SEC
BH CV ECHO MEAS - AI P1/2T: 822.8 MSEC
BH CV ECHO MEAS - AO MAX PG (FULL): 2.4 MMHG
BH CV ECHO MEAS - AO MAX PG: 4.7 MMHG
BH CV ECHO MEAS - AO MEAN PG (FULL): 1 MMHG
BH CV ECHO MEAS - AO MEAN PG: 2 MMHG
BH CV ECHO MEAS - AO ROOT AREA (BSA CORRECTED): 2
BH CV ECHO MEAS - AO ROOT AREA: 11.9 CM^2
BH CV ECHO MEAS - AO ROOT DIAM: 3.9 CM
BH CV ECHO MEAS - AO V2 MAX: 108 CM/SEC
BH CV ECHO MEAS - AO V2 MEAN: 69.6 CM/SEC
BH CV ECHO MEAS - AO V2 VTI: 19.5 CM
BH CV ECHO MEAS - AVA(I,A): 2.9 CM^2
BH CV ECHO MEAS - AVA(I,D): 2.9 CM^2
BH CV ECHO MEAS - AVA(V,A): 2.4 CM^2
BH CV ECHO MEAS - AVA(V,D): 2.4 CM^2
BH CV ECHO MEAS - BSA(HAYCOCK): 2 M^2
BH CV ECHO MEAS - BSA: 1.9 M^2
BH CV ECHO MEAS - BZI_BMI: 27.1 KILOGRAMS/M^2
BH CV ECHO MEAS - BZI_METRIC_HEIGHT: 172.7 CM
BH CV ECHO MEAS - BZI_METRIC_WEIGHT: 80.7 KG
BH CV ECHO MEAS - EDV(CUBED): 117.6 ML
BH CV ECHO MEAS - EDV(MOD-SP2): 106 ML
BH CV ECHO MEAS - EDV(MOD-SP4): 134 ML
BH CV ECHO MEAS - EDV(TEICH): 112.8 ML
BH CV ECHO MEAS - EF(CUBED): 66.6 %
BH CV ECHO MEAS - EF(MOD-BP): 51 %
BH CV ECHO MEAS - EF(MOD-SP2): 56.6 %
BH CV ECHO MEAS - EF(MOD-SP4): 50 %
BH CV ECHO MEAS - EF(TEICH): 58 %
BH CV ECHO MEAS - ESV(CUBED): 39.3 ML
BH CV ECHO MEAS - ESV(MOD-SP2): 46 ML
BH CV ECHO MEAS - ESV(MOD-SP4): 67 ML
BH CV ECHO MEAS - ESV(TEICH): 47.4 ML
BH CV ECHO MEAS - FS: 30.6 %
BH CV ECHO MEAS - IVS/LVPW: 0.92
BH CV ECHO MEAS - IVSD: 1.1 CM
BH CV ECHO MEAS - LAT PEAK E' VEL: 5.1 CM/SEC
BH CV ECHO MEAS - LV DIASTOLIC VOL/BSA (35-75): 68.9 ML/M^2
BH CV ECHO MEAS - LV MASS(C)D: 213.3 GRAMS
BH CV ECHO MEAS - LV MASS(C)DI: 109.6 GRAMS/M^2
BH CV ECHO MEAS - LV MAX PG: 2.3 MMHG
BH CV ECHO MEAS - LV MEAN PG: 1 MMHG
BH CV ECHO MEAS - LV SYSTOLIC VOL/BSA (12-30): 34.4 ML/M^2
BH CV ECHO MEAS - LV V1 MAX: 75.4 CM/SEC
BH CV ECHO MEAS - LV V1 MEAN: 52.6 CM/SEC
BH CV ECHO MEAS - LV V1 VTI: 16.1 CM
BH CV ECHO MEAS - LVIDD: 4.9 CM
BH CV ECHO MEAS - LVIDS: 3.4 CM
BH CV ECHO MEAS - LVLD AP2: 7.7 CM
BH CV ECHO MEAS - LVLD AP4: 7.6 CM
BH CV ECHO MEAS - LVLS AP2: 6.6 CM
BH CV ECHO MEAS - LVLS AP4: 7.5 CM
BH CV ECHO MEAS - LVOT AREA (M): 3.5 CM^2
BH CV ECHO MEAS - LVOT AREA: 3.5 CM^2
BH CV ECHO MEAS - LVOT DIAM: 2.1 CM
BH CV ECHO MEAS - LVPWD: 1.2 CM
BH CV ECHO MEAS - MED PEAK E' VEL: 4.1 CM/SEC
BH CV ECHO MEAS - MV A DUR: 0.22 SEC
BH CV ECHO MEAS - MV A MAX VEL: 92.6 CM/SEC
BH CV ECHO MEAS - MV DEC SLOPE: 165 CM/SEC^2
BH CV ECHO MEAS - MV DEC TIME: 0.22 SEC
BH CV ECHO MEAS - MV E MAX VEL: 53.8 CM/SEC
BH CV ECHO MEAS - MV E/A: 0.58
BH CV ECHO MEAS - MV MAX PG: 3.7 MMHG
BH CV ECHO MEAS - MV MEAN PG: 1 MMHG
BH CV ECHO MEAS - MV P1/2T MAX VEL: 60.1 CM/SEC
BH CV ECHO MEAS - MV P1/2T: 106.7 MSEC
BH CV ECHO MEAS - MV V2 MAX: 95.9 CM/SEC
BH CV ECHO MEAS - MV V2 MEAN: 54.1 CM/SEC
BH CV ECHO MEAS - MV V2 VTI: 26 CM
BH CV ECHO MEAS - MVA P1/2T LCG: 3.7 CM^2
BH CV ECHO MEAS - MVA(P1/2T): 2.1 CM^2
BH CV ECHO MEAS - MVA(VTI): 2.1 CM^2
BH CV ECHO MEAS - PA ACC TIME: 0.1 SEC
BH CV ECHO MEAS - PA MAX PG (FULL): 1.8 MMHG
BH CV ECHO MEAS - PA MAX PG: 2.7 MMHG
BH CV ECHO MEAS - PA PR(ACCEL): 36.3 MMHG
BH CV ECHO MEAS - PA V2 MAX: 81.4 CM/SEC
BH CV ECHO MEAS - PI END-D VEL: 77.4 CM/SEC
BH CV ECHO MEAS - PULM A REVS DUR: 0.17 SEC
BH CV ECHO MEAS - PULM A REVS VEL: 25.2 CM/SEC
BH CV ECHO MEAS - PULM DIAS VEL: 22.4 CM/SEC
BH CV ECHO MEAS - PULM S/D: 1.9
BH CV ECHO MEAS - PULM SYS VEL: 43 CM/SEC
BH CV ECHO MEAS - PVA(V,A): 1.8 CM^2
BH CV ECHO MEAS - PVA(V,D): 1.8 CM^2
BH CV ECHO MEAS - QP/QS: 0.59
BH CV ECHO MEAS - RAP SYSTOLE: 3 MMHG
BH CV ECHO MEAS - RV MAX PG: 0.84 MMHG
BH CV ECHO MEAS - RV MEAN PG: 1 MMHG
BH CV ECHO MEAS - RV V1 MAX: 45.8 CM/SEC
BH CV ECHO MEAS - RV V1 MEAN: 34.1 CM/SEC
BH CV ECHO MEAS - RV V1 VTI: 10.4 CM
BH CV ECHO MEAS - RVOT AREA: 3.1 CM^2
BH CV ECHO MEAS - RVOT DIAM: 2 CM
BH CV ECHO MEAS - RVSP: 38 MMHG
BH CV ECHO MEAS - SI(AO): 119.8 ML/M^2
BH CV ECHO MEAS - SI(CUBED): 40.3 ML/M^2
BH CV ECHO MEAS - SI(LVOT): 28.7 ML/M^2
BH CV ECHO MEAS - SI(MOD-SP2): 30.8 ML/M^2
BH CV ECHO MEAS - SI(MOD-SP4): 34.4 ML/M^2
BH CV ECHO MEAS - SI(TEICH): 33.6 ML/M^2
BH CV ECHO MEAS - SV(AO): 232.9 ML
BH CV ECHO MEAS - SV(CUBED): 78.3 ML
BH CV ECHO MEAS - SV(LVOT): 55.8 ML
BH CV ECHO MEAS - SV(MOD-SP2): 60 ML
BH CV ECHO MEAS - SV(MOD-SP4): 67 ML
BH CV ECHO MEAS - SV(RVOT): 32.7 ML
BH CV ECHO MEAS - SV(TEICH): 65.4 ML
BH CV ECHO MEAS - TAPSE (>1.6): 2.1 CM2
BH CV ECHO MEAS - TR MAX VEL: 297 CM/SEC
BH CV ECHO MEASUREMENTS AVERAGE E/E' RATIO: 11.7
BH CV XLRA - RV BASE: 3.2 CM
BH CV XLRA - RV LENGTH: 7 CM
BH CV XLRA - RV MID: 2.6 CM
BH CV XLRA - TDI S': 12.5 CM/SEC
CHOLEST SERPL-MCNC: 122 MG/DL (ref 0–200)
HBA1C MFR BLD: 5.8 % (ref 4.8–5.6)
HDLC SERPL-MCNC: 32 MG/DL (ref 40–60)
LDLC SERPL CALC-MCNC: 67 MG/DL (ref 0–100)
LDLC/HDLC SERPL: 2.08 {RATIO}
LEFT ATRIUM VOLUME INDEX: 36 ML/M2
LV EF 2D ECHO EST: 51 %
MAXIMAL PREDICTED HEART RATE: 137 BPM
STRESS TARGET HR: 116 BPM
TRIGL SERPL-MCNC: 117 MG/DL (ref 0–150)
VLDLC SERPL-MCNC: 23.4 MG/DL (ref 5–40)

## 2019-03-16 PROCEDURE — 80061 LIPID PANEL: CPT | Performed by: INTERNAL MEDICINE

## 2019-03-16 PROCEDURE — 93306 TTE W/DOPPLER COMPLETE: CPT

## 2019-03-16 PROCEDURE — G0378 HOSPITAL OBSERVATION PER HR: HCPCS

## 2019-03-16 PROCEDURE — 93306 TTE W/DOPPLER COMPLETE: CPT | Performed by: INTERNAL MEDICINE

## 2019-03-16 PROCEDURE — 99214 OFFICE O/P EST MOD 30 MIN: CPT | Performed by: NURSE PRACTITIONER

## 2019-03-16 PROCEDURE — 83036 HEMOGLOBIN GLYCOSYLATED A1C: CPT | Performed by: INTERNAL MEDICINE

## 2019-03-16 PROCEDURE — 25010000002 PERFLUTREN (DEFINITY) 8.476 MG IN SODIUM CHLORIDE 0.9 % 10 ML INJECTION: Performed by: INTERNAL MEDICINE

## 2019-03-16 RX ORDER — ASPIRIN 81 MG/1
81 TABLET, CHEWABLE ORAL DAILY
Status: DISCONTINUED | OUTPATIENT
Start: 2019-03-17 | End: 2019-03-16 | Stop reason: HOSPADM

## 2019-03-16 RX ORDER — ATORVASTATIN CALCIUM 20 MG/1
40 TABLET, FILM COATED ORAL NIGHTLY
Status: DISCONTINUED | OUTPATIENT
Start: 2019-03-16 | End: 2019-03-16 | Stop reason: HOSPADM

## 2019-03-16 RX ADMIN — SODIUM CHLORIDE, PRESERVATIVE FREE 3 ML: 5 INJECTION INTRAVENOUS at 10:43

## 2019-03-16 RX ADMIN — LOSARTAN POTASSIUM 100 MG: 100 TABLET, FILM COATED ORAL at 10:30

## 2019-03-16 RX ADMIN — PERFLUTREN 2.5 ML: 6.52 INJECTION, SUSPENSION INTRAVENOUS at 08:45

## 2019-03-16 RX ADMIN — HYDROCHLOROTHIAZIDE 12.5 MG: 25 TABLET ORAL at 10:30

## 2019-03-16 NOTE — DISCHARGE SUMMARY
Patient Name: Akin Chaudhry  : 1935  MRN: [id number]    Date of Admission: 3/15/2019  Date of Discharge:  3/16/2019  Primary Care Physician: Neda Parker MD      Hospital Course     Chief Complaint:   Dizziness and Nausea      Active Hospital Problems    Diagnosis  POA   • **Vestibular neuritis [H81.20]  Yes   • Dizziness [R42]  Yes   • Benign prostatic hyperplasia with urinary frequency [N40.1, R35.0]  Yes   • Hypertension [I10]  Yes   • Hyperlipidemia [E78.5]  Yes   • Chronic coronary artery disease [I25.10]  Yes      Resolved Hospital Problems   No resolved problems to display.        Hospital Course:    The patient is an 83-year-old male with a history of hypertension and hyperlipidemia who presented to the hospital for dizziness.  He was admitted to our service for stroke workup.  Dizziness resolved by the time he got to the hospital.  This occurred a couple weeks after he had an upper respiratory infection.  He had a negative stroke workup.  2D echo did show some akinetic segments and he does not have a history of MI.  Recommend him following up with his primary care provider in 1 week to discuss further workup with stress test or referral to cardiology.  It is felt he had vestibular neuritis secondary to recent upper respiratory infection.  His symptoms have resolved and they have not returned.  He is to continue his home medications and follow-up with his primary care provider.  I discussed discharge plan with patient and he is in agreement.        Day of Discharge       Physical Exam:  Temp:  [97.1 °F (36.2 °C)-98.1 °F (36.7 °C)] 97.8 °F (36.6 °C)  Heart Rate:  [63-75] 75  Resp:  [16-20] 16  BP: (135-160)/(77-97) 135/81  Body mass index is 27.06 kg/m².  Physical Exam  Constitutional: He is oriented to person, place, and time. He appears well-developed and well-nourished. No distress.   Cardiovascular: Normal rate, regular rhythm and normal heart sounds. Exam reveals no gallop and no  friction rub.   No murmur heard.  Pulmonary/Chest: Effort normal and breath sounds normal. No respiratory distress. He has no wheezes. He has no rales.   Abdominal: Soft. Bowel sounds are normal. He exhibits no distension. There is no tenderness. There is no rebound and no guarding.   Musculoskeletal: He exhibits no edema or tenderness.   Lymphadenopathy:     He has no cervical adenopathy.   Neurological: He is alert and oriented to person, place, and time. He has normal strength. He is not disoriented. He displays no tremor. No cranial nerve deficit. GCS eye subscore is 4. GCS verbal subscore is 5. GCS motor subscore is 6.   No nystagmus   Skin: Skin is warm and dry. He is not diaphoretic.   Psychiatric: He has a normal mood and affect. His behavior is normal.   Vitals reviewed.        Consultants     Consult Orders (all) (From admission, onward)    Start     Ordered     Consulting Physician(s)     Provider Relationship Specialty    Edmund Cazares MD Consulting Physician Neurology        Pertinent Labs and Procedures     Results from last 7 days   Lab Units 03/15/19  0729   WBC 10*3/mm3 8.49   HEMOGLOBIN g/dL 15.2   PLATELETS 10*3/mm3 210     Results from last 7 days   Lab Units 03/15/19  0729   SODIUM mmol/L 144   POTASSIUM mmol/L 4.2   CHLORIDE mmol/L 108*   CO2 mmol/L 24.5   BUN mg/dL 18   CREATININE mg/dL 0.98   GLUCOSE mg/dL 108*   Estimated Creatinine Clearance: 65.2 mL/min (by C-G formula based on SCr of 0.98 mg/dL).  Results from last 7 days   Lab Units 03/15/19  0729   ALBUMIN g/dL 3.90   BILIRUBIN mg/dL 1.0   ALK PHOS U/L 78   AST (SGOT) U/L 10   ALT (SGPT) U/L 12     Results from last 7 days   Lab Units 03/15/19  0729   CALCIUM mg/dL 9.1   ALBUMIN g/dL 3.90           Results from last 7 days   Lab Units 03/16/19  0410   CHOLESTEROL mg/dL 122   TRIGLYCERIDES mg/dL 117   HDL CHOL mg/dL 32*   LDL CHOL mg/dL 67               Ct Head Without Contrast    Result Date: 3/15/2019   No evidence for acute  intracranial pathology. However, if there continues to be clinical concern for a CT occult infarct, one could obtain an MRI of the brain for more sensitive and specific evaluation. This especially holds true for pathology within the posterior fossa.  The findings and recommendations were discussed with Dr. Mooney on 03/15/2019 at approximately 8:20 AM.  Radiation dose reduction techniques were utilized, including automated exposure control and exposure modulation based on body size.  This report was finalized on 3/15/2019 5:40 PM by Dr. Jamal Steward M.D.      Mri Angiogram Head Without Contrast    Result Date: 3/15/2019    1. No acute infarct. No enhancing lesion of brain. 2. No hemodynamically significant focal stenosis, aneurysm, or dissection is identified in the cervical carotid or vertebral arteries, or in the arteries at the base of the brain.  This report was finalized on 3/15/2019 5:22 PM by Dr. Mahesh Stein M.D.      Mri Angiogram Neck With & Without Contrast    Result Date: 3/15/2019    1. No acute infarct. No enhancing lesion of brain. 2. No hemodynamically significant focal stenosis, aneurysm, or dissection is identified in the cervical carotid or vertebral arteries, or in the arteries at the base of the brain.  This report was finalized on 3/15/2019 5:22 PM by Dr. Mahesh Stein M.D.      Mri Brain With & Without Contrast    Result Date: 3/15/2019    1. No acute infarct. No enhancing lesion of brain. 2. No hemodynamically significant focal stenosis, aneurysm, or dissection is identified in the cervical carotid or vertebral arteries, or in the arteries at the base of the brain.  This report was finalized on 3/15/2019 5:22 PM by Dr. Mahesh Stein M.D.        Results for orders placed during the hospital encounter of 04/23/18   Duplex Carotid Ultrasound CAR    Narrative · Proximal right internal carotid artery mild stenosis.  · Proximal left internal carotid artery mild stenosis.           Results for orders placed during the hospital encounter of 03/15/19   Transthoracic Echo Complete With Contrast if Necessary Per Protocol    Narrative · Left ventricular systolic function is normal. Calculated EF = 51%.   Estimated EF was in agreement with the calculated EF. Estimated EF = 51%.   Normal left ventricular cavity size noted. All left ventricular wall   segments contract normally. Left ventricular wall thickness is consistent   with mild concentric hypertrophy. Left ventricular diastolic dysfunction   is noted (grade I) consistent with impaired relaxation.  · The following left ventricular wall segments are akinetic: basal   inferior and basal inferoseptal. Consider prior myocardial infcrction or   infiltrative disease such as sardoidosis  · Left atrial volume is moderately increased. Right atrial cavity size is   borderline dilated.  · There is moderate thickening of the aortic valve. Trace aortic valve   regurgitation is present.  · Mild MAC is present. Trace mitral valve regurgitation is present.  · Mild tricuspid valve regurgitation is present. Estimated right   ventricular systolic pressure from tricuspid regurgitation is mildly   elevated (35-45 mmHg). Calculated right ventricular systolic pressure from   tricuspid regurgitation is 38 mmHg.          Test Results Pending at Discharge   None    Discharge Details        Discharge Medications      Continue These Medications      Instructions Start Date   aspirin 81 MG tablet   81 mg, Oral, Daily      atorvastatin 40 MG tablet  Commonly known as:  LIPITOR   40 mg, Oral, Daily      finasteride 1 MG tablet  Commonly known as:  PROPECIA   TAKE ONE TABLET BY MOUTH ONCE DAILY      furosemide 20 MG tablet  Commonly known as:  LASIX   Take one half tablet by mouth daily.      hydrochlorothiazide 12.5 MG tablet  Commonly known as:  HYDRODIURIL   12.5 mg, Oral, Daily      losartan 100 MG tablet  Commonly known as:  COZAAR   TAKE 1 TABLET EVERY DAY       potassium chloride 20 MEQ CR tablet  Commonly known as:  K-DUR,KLOR-CON   TAKE ONE TABLET BY MOUTH ONCE DAILY      tamsulosin 0.4 MG capsule 24 hr capsule  Commonly known as:  FLOMAX   1 capsule, Oral, Nightly             Allergies   Allergen Reactions   • Buffered Aspirin Other (See Comments)     Upset stomach   • Bactrim [Sulfamethoxazole-Trimethoprim] Rash         Discharge Disposition:  Home or Self Care    Discharge Diet:  Diet Order   Procedures   • Diet Regular; Thin; Cardiac       Discharge Activity:   Activity Instructions     Activity as Tolerated            CODE STATUS:    Code Status and Medical Interventions:   Ordered at: 03/15/19 1210     Code Status:    CPR     Medical Interventions (Level of Support Prior to Arrest):    Full       No future appointments.  Follow-up Information     Neda Parker MD Follow up in 1 week(s).    Specialty:  Internal Medicine  Why:  Hospital follow-up, discuss echocardiogram findings and possible stress test  Contact information:  [address] [place] [id number]  [phone]                   Electronically signed by Roshan Lloyd MD, 3/16/2019, 3:00 PM

## 2019-03-16 NOTE — NURSING NOTE
Called and verified that pt is ok to be d/c'ed from neurology standpoint. Spoke with Dr. Cazares. Will continue to monitor.

## 2019-03-16 NOTE — PLAN OF CARE
Problem: Patient Care Overview  Goal: Plan of Care Review  Outcome: Ongoing (interventions implemented as appropriate)   03/16/19 1652   Coping/Psychosocial   Plan of Care Reviewed With patient   Plan of Care Review   Progress improving   OTHER   Outcome Summary VSS, am labs, up ad bethany, NIH 0, neuro to see, echo today, poss d/c

## 2019-03-16 NOTE — SIGNIFICANT NOTE
03/16/19 1000   Rehab Time/Intention   Evaluation Not Performed other (see comments)  (Pateint passed swallow screen, MRI negative for CVA. Tolerating diet.Will sign off, please re-consult if needed.)   Rehab Treatment   Discipline speech language pathologist

## 2019-03-16 NOTE — PROGRESS NOTES
"DOS: 3/16/2019  NAME: Akin Chaudhry   : 1935  PCP: Neda Parker MD  Chief Complaint   Patient presents with   • Dizziness   • Nausea     Stroke    Subjective: Pt seen in follow up today, however the problem is new to the examiner.  Patient states dizziness is completely resolved and he has been out of bed this morning.  No new complaints.      Objective:  Vital signs: /77   Pulse 74   Temp 98.1 °F (36.7 °C) (Oral)   Resp 16   Ht 172.7 cm (68\")   Wt 80.7 kg (178 lb)   SpO2 97%   BMI 27.06 kg/m²       General appearance: Well developed, well nourished, well groomed, alert and cooperative.   HEENT: Normocephalic.   Neck and spine: Normal range of motion. Normal alignment. No mass or tenderness.    Cardiac: Regular rate and rhythm.   Peripheral Vasculature: Peripheral pulses are equal and symmetric.  Chest Exam: Clear to auscultation bilaterally, no wheezes, no rhonchi.  Extremities: Normal, no edema.   Skin: No rashes or birthmarks.     Higher integrative function: Oriented to time, place, person, intact recent and remote memory, attention span, concentration and language. Spontaneous speech, fund of vocabulary are normal.   CN II: Normal visual fields.   CN III IV VI: Extraocular movements are full without nystagmus. Pupils are equal, round, and reactive to light.   CN V: Normal facial sensation.  CN VII: Facial movements are symmetric, no weakness.   CN VIII: Auditory acuity is normal.   CN IX & X: Symmetric palatal movement.   CN XI: Sternocleidomastoid and trapezius are normal. No weakness.   CN XII: The tongue is midline.   Motor: Normal muscle strength, bulk, and tone in upper and lower extremities. No fasciculations, rigidity, spasticity or abnormal movements.   Sensation: Normal light touch.  Station and gait: Not assessed.  Muscle stretch reflexes: Reflexes are normal and symmetric in the upper and lower extremities.   Plantar reflexes are flexor bilaterally.   Coordination: Finger to " nose test showed no dysmetria.    Scheduled Meds:  aspirin 325 mg Oral Daily   Or      aspirin 300 mg Rectal Daily   atorvastatin 80 mg Oral Nightly   finasteride 1 mg Oral Daily   hydrochlorothiazide 12.5 mg Oral Daily   losartan 100 mg Oral Daily   sodium chloride 3 mL Intravenous Q12H   tamsulosin 0.4 mg Oral Nightly     Continuous Infusions:   PRN Meds:.•  acetaminophen **OR** acetaminophen  •  ondansetron **OR** ondansetron ODT **OR** ondansetron  •  [COMPLETED] Insert peripheral IV **AND** sodium chloride  •  sodium chloride    Laboratory results:  Lab Results   Component Value Date    GLUCOSE 108 (H) 03/15/2019    CALCIUM 9.1 03/15/2019     03/15/2019    K 4.2 03/15/2019    CO2 24.5 03/15/2019     (H) 03/15/2019    BUN 18 03/15/2019    CREATININE 0.98 03/15/2019    EGFRIFAFRI 86 04/18/2018    EGFRIFNONA 73 03/15/2019    BCR 18.4 03/15/2019    ANIONGAP 11.5 03/15/2019     Lab Results   Component Value Date    WBC 8.49 03/15/2019    HGB 15.2 03/15/2019    HCT 47.3 03/15/2019    MCV 86.5 03/15/2019     03/15/2019     Lab Results   Component Value Date    CHOL 122 03/16/2019     Lab Results   Component Value Date    HDL 32 (L) 03/16/2019    HDL 45 04/18/2018    HDL 38 (L) 02/17/2017     Lab Results   Component Value Date    LDL 67 03/16/2019    LDL 94 04/18/2018    LDL 96 02/17/2017     Lab Results   Component Value Date    TRIG 117 03/16/2019    TRIG 126 04/18/2018    TRIG 111 02/17/2017     Lab Results   Component Value Date    HGBA1C 5.80 (H) 03/16/2019     EKG tracings 3/15 reviewed by me, shows sinus rhythm  Imaging: MRI brain images read by me, no acute findings seen  MRI BRAIN W WO CONTRAST-, MRI ANGIOGRAM HEAD WO CONTRAST-, MRI ANGIOGRAM  NECK W WO CONTRAST-     INDICATIONS: Stroke     TECHNIQUE: Multiplanar multisequence magnetic resonance imaging of the  brain, without and with intravenous contrast material. Magnetic  resonance angiography of the head and neck. NASCET criteria.      FINDINGS:     Brain MRI:           The diffusion-weighted images show no restricted diffusion to suggest  acute infarct.     The midline structures appear unremarkable.     The brain parenchyma shows minimal periventricular white matter T2 FLAIR  signal hyperintensities suggesting chronic small vessel ischemic change  in a patient this age. No enhancing lesions of brain are noted. Vascular  flow related artifact is present.     Flow voids in the major arteries at the base of the brain appear  unremarkable.     Mucosal thickening and mucous retention cysts of the left maxillary  sinus. Fluid signal is seen within left mastoid air cells. The paranasal  sinuses, mastoid air cells, and orbits appear otherwise unremarkable.           Magnetic resonance angiography of the head and neck:     No hemodynamically significant focal stenosis, aneurysm, or dissection  is identified in the cervical carotid or vertebral arteries, or in the  arteries at the base of the brain.     IMPRESSION:        1. No acute infarct. No enhancing lesion of brain.  2. No hemodynamically significant focal stenosis, aneurysm, or  dissection is identified in the cervical carotid or vertebral arteries,  or in the arteries at the base of the brain.     This report was finalized on 3/15/2019 5:22 PM by Dr. Mahesh Stein M.D.       Impression:   is an 83-year-old male with a PMH of HTN, HLP, BPH, and former tobacco abuse who was admitted for lightheadedness and dizziness with loss of balance.  Patient was having difficulty with gait as well as some associated nausea.  He had a recent upper respiratory infection.  Prior to admission he was on aspirin 81 mg and Lipitor 40 mg.    Dx: Dizziness/vertigo, favor vestibular cause, not TIA or stroke.  MRI brain w/wo 3/15: Acute findings, no abnormal enhancement  MRA head and neck 3/15: Unremarkable  Labs: LDL 67, hemoglobin A1c 5.80%    Plan:  Follow-up TTE, if negative can be discharged from  neurology standpoint.   Can resume home doses of aspirin 81 mg and Lipitor 40 mg.  Patient discussed with Dr. Cazares today, will sign off.  Please call if further questions.

## 2019-03-17 NOTE — NURSING NOTE
Request for stroke screening received per Epic order set 3/15/19.  Followed peripherally and noted d/c home.  Signing off. Thank you --Anisa Sandy,  Rehab Adm Nurse

## 2020-04-06 ENCOUNTER — APPOINTMENT (OUTPATIENT)
Dept: GENERAL RADIOLOGY | Facility: HOSPITAL | Age: 85
End: 2020-04-06

## 2020-04-06 ENCOUNTER — HOSPITAL ENCOUNTER (INPATIENT)
Facility: HOSPITAL | Age: 85
LOS: 8 days | Discharge: HOME | End: 2020-04-14
Attending: EMERGENCY MEDICINE | Admitting: INTERNAL MEDICINE

## 2020-04-06 DIAGNOSIS — J18.9 PNEUMONIA OF BOTH LUNGS DUE TO INFECTIOUS ORGANISM, UNSPECIFIED PART OF LUNG: Primary | ICD-10-CM

## 2020-04-06 DIAGNOSIS — Z20.822 SUSPECTED COVID-19 VIRUS INFECTION: ICD-10-CM

## 2020-04-06 DIAGNOSIS — J96.01 ACUTE RESPIRATORY FAILURE WITH HYPOXIA (HCC): ICD-10-CM

## 2020-04-06 PROBLEM — I48.21 PERMANENT ATRIAL FIBRILLATION (HCC): Status: ACTIVE | Noted: 2020-04-06

## 2020-04-06 LAB
ALBUMIN SERPL-MCNC: 3.6 G/DL (ref 3.5–5.2)
ALBUMIN/GLOB SERPL: 1.1 G/DL
ALP SERPL-CCNC: 77 U/L (ref 39–117)
ALT SERPL W P-5'-P-CCNC: 27 U/L (ref 1–41)
ANION GAP SERPL CALCULATED.3IONS-SCNC: 15.3 MMOL/L (ref 5–15)
ARTERIAL PATENCY WRIST A: POSITIVE
AST SERPL-CCNC: 26 U/L (ref 1–40)
ATMOSPHERIC PRESS: 752.6 MMHG
B PARAPERT DNA SPEC QL NAA+PROBE: NOT DETECTED
B PERT DNA SPEC QL NAA+PROBE: NOT DETECTED
BASE EXCESS BLDA CALC-SCNC: -0.9 MMOL/L (ref 0–2)
BASOPHILS # BLD AUTO: 0.02 10*3/MM3 (ref 0–0.2)
BASOPHILS NFR BLD AUTO: 0.3 % (ref 0–1.5)
BDY SITE: ABNORMAL
BILIRUB SERPL-MCNC: 1.6 MG/DL (ref 0.2–1.2)
BUN BLD-MCNC: 23 MG/DL (ref 8–23)
BUN/CREAT SERPL: 21.1 (ref 7–25)
C PNEUM DNA NPH QL NAA+NON-PROBE: NOT DETECTED
CALCIUM SPEC-SCNC: 8.9 MG/DL (ref 8.6–10.5)
CHLORIDE SERPL-SCNC: 101 MMOL/L (ref 98–107)
CO2 SERPL-SCNC: 22.7 MMOL/L (ref 22–29)
CREAT BLD-MCNC: 1.09 MG/DL (ref 0.76–1.27)
CRP SERPL-MCNC: 11.75 MG/DL (ref 0–0.5)
D-LACTATE SERPL-SCNC: 1.6 MMOL/L (ref 0.5–2)
D-LACTATE SERPL-SCNC: 2.4 MMOL/L (ref 0.5–2)
DEPRECATED RDW RBC AUTO: 42.5 FL (ref 37–54)
EOSINOPHIL # BLD AUTO: 0.07 10*3/MM3 (ref 0–0.4)
EOSINOPHIL NFR BLD AUTO: 0.9 % (ref 0.3–6.2)
ERYTHROCYTE [DISTWIDTH] IN BLOOD BY AUTOMATED COUNT: 13.9 % (ref 12.3–15.4)
FERRITIN SERPL-MCNC: 1288 NG/ML (ref 30–400)
FLUAV H1 2009 PAND RNA NPH QL NAA+PROBE: NOT DETECTED
FLUAV H1 HA GENE NPH QL NAA+PROBE: NOT DETECTED
FLUAV H3 RNA NPH QL NAA+PROBE: NOT DETECTED
FLUAV SUBTYP SPEC NAA+PROBE: NOT DETECTED
FLUBV RNA ISLT QL NAA+PROBE: NOT DETECTED
GAS FLOW AIRWAY: 6 LPM
GFR SERPL CREATININE-BSD FRML MDRD: 64 ML/MIN/1.73
GLOBULIN UR ELPH-MCNC: 3.3 GM/DL
GLUCOSE BLD-MCNC: 128 MG/DL (ref 65–99)
GLUCOSE BLDC GLUCOMTR-MCNC: 100 MG/DL (ref 70–130)
GLUCOSE BLDC GLUCOMTR-MCNC: 100 MG/DL (ref 70–130)
GLUCOSE BLDC GLUCOMTR-MCNC: 107 MG/DL (ref 70–130)
GLUCOSE BLDC GLUCOMTR-MCNC: 115 MG/DL (ref 70–130)
HADV DNA SPEC NAA+PROBE: NOT DETECTED
HCO3 BLDA-SCNC: 20 MMOL/L (ref 22–28)
HCOV 229E RNA SPEC QL NAA+PROBE: NOT DETECTED
HCOV HKU1 RNA SPEC QL NAA+PROBE: NOT DETECTED
HCOV NL63 RNA SPEC QL NAA+PROBE: NOT DETECTED
HCOV OC43 RNA SPEC QL NAA+PROBE: NOT DETECTED
HCT VFR BLD AUTO: 50 % (ref 37.5–51)
HGB BLD-MCNC: 16.4 G/DL (ref 13–17.7)
HMPV RNA NPH QL NAA+NON-PROBE: NOT DETECTED
HPIV1 RNA SPEC QL NAA+PROBE: NOT DETECTED
HPIV2 RNA SPEC QL NAA+PROBE: NOT DETECTED
HPIV3 RNA NPH QL NAA+PROBE: NOT DETECTED
HPIV4 P GENE NPH QL NAA+PROBE: NOT DETECTED
IMM GRANULOCYTES # BLD AUTO: 0.04 10*3/MM3 (ref 0–0.05)
IMM GRANULOCYTES NFR BLD AUTO: 0.5 % (ref 0–0.5)
LACTATE HOLD SPECIMEN: NORMAL
LDH SERPL-CCNC: 325 U/L (ref 135–225)
LYMPHOCYTES # BLD AUTO: 1.21 10*3/MM3 (ref 0.7–3.1)
LYMPHOCYTES NFR BLD AUTO: 16.4 % (ref 19.6–45.3)
M PNEUMO IGG SER IA-ACNC: NOT DETECTED
MCH RBC QN AUTO: 27.6 PG (ref 26.6–33)
MCHC RBC AUTO-ENTMCNC: 32.8 G/DL (ref 31.5–35.7)
MCV RBC AUTO: 84.2 FL (ref 79–97)
MODALITY: ABNORMAL
MONOCYTES # BLD AUTO: 0.66 10*3/MM3 (ref 0.1–0.9)
MONOCYTES NFR BLD AUTO: 9 % (ref 5–12)
NEUTROPHILS # BLD AUTO: 5.37 10*3/MM3 (ref 1.7–7)
NEUTROPHILS NFR BLD AUTO: 72.9 % (ref 42.7–76)
NRBC BLD AUTO-RTO: 0 /100 WBC (ref 0–0.2)
NT-PROBNP SERPL-MCNC: 850.9 PG/ML (ref 5–1800)
PCO2 BLDA: 24.9 MM HG (ref 35–45)
PH BLDA: 7.51 PH UNITS (ref 7.35–7.45)
PLATELET # BLD AUTO: 264 10*3/MM3 (ref 140–450)
PMV BLD AUTO: 9.4 FL (ref 6–12)
PO2 BLDA: 60.2 MM HG (ref 80–100)
POTASSIUM BLD-SCNC: 3.9 MMOL/L (ref 3.5–5.2)
PROCALCITONIN SERPL-MCNC: 0.11 NG/ML (ref 0.1–0.25)
PROT SERPL-MCNC: 6.9 G/DL (ref 6–8.5)
RBC # BLD AUTO: 5.94 10*6/MM3 (ref 4.14–5.8)
RHINOVIRUS RNA SPEC NAA+PROBE: NOT DETECTED
RSV RNA NPH QL NAA+NON-PROBE: NOT DETECTED
SAO2 % BLDCOA: 93.6 % (ref 92–99)
SODIUM BLD-SCNC: 139 MMOL/L (ref 136–145)
TOTAL RATE: 22 BREATHS/MINUTE
TROPONIN T SERPL-MCNC: <0.01 NG/ML (ref 0–0.03)
WBC NRBC COR # BLD: 7.37 10*3/MM3 (ref 3.4–10.8)

## 2020-04-06 PROCEDURE — 71045 X-RAY EXAM CHEST 1 VIEW: CPT

## 2020-04-06 PROCEDURE — 84145 PROCALCITONIN (PCT): CPT | Performed by: EMERGENCY MEDICINE

## 2020-04-06 PROCEDURE — 82962 GLUCOSE BLOOD TEST: CPT

## 2020-04-06 PROCEDURE — U0002 COVID-19 LAB TEST NON-CDC: HCPCS | Performed by: EMERGENCY MEDICINE

## 2020-04-06 PROCEDURE — 83615 LACTATE (LD) (LDH) ENZYME: CPT | Performed by: EMERGENCY MEDICINE

## 2020-04-06 PROCEDURE — 99285 EMERGENCY DEPT VISIT HI MDM: CPT

## 2020-04-06 PROCEDURE — 83880 ASSAY OF NATRIURETIC PEPTIDE: CPT | Performed by: EMERGENCY MEDICINE

## 2020-04-06 PROCEDURE — 82803 BLOOD GASES ANY COMBINATION: CPT

## 2020-04-06 PROCEDURE — 80053 COMPREHEN METABOLIC PANEL: CPT | Performed by: EMERGENCY MEDICINE

## 2020-04-06 PROCEDURE — 85025 COMPLETE CBC W/AUTO DIFF WBC: CPT | Performed by: EMERGENCY MEDICINE

## 2020-04-06 PROCEDURE — 82728 ASSAY OF FERRITIN: CPT | Performed by: EMERGENCY MEDICINE

## 2020-04-06 PROCEDURE — 87040 BLOOD CULTURE FOR BACTERIA: CPT | Performed by: EMERGENCY MEDICINE

## 2020-04-06 PROCEDURE — 84484 ASSAY OF TROPONIN QUANT: CPT | Performed by: EMERGENCY MEDICINE

## 2020-04-06 PROCEDURE — 86140 C-REACTIVE PROTEIN: CPT | Performed by: EMERGENCY MEDICINE

## 2020-04-06 PROCEDURE — 36600 WITHDRAWAL OF ARTERIAL BLOOD: CPT

## 2020-04-06 PROCEDURE — 93010 ELECTROCARDIOGRAM REPORT: CPT | Performed by: INTERNAL MEDICINE

## 2020-04-06 PROCEDURE — 83605 ASSAY OF LACTIC ACID: CPT | Performed by: EMERGENCY MEDICINE

## 2020-04-06 PROCEDURE — 0100U HC BIOFIRE FILMARRAY RESP PANEL 2: CPT | Performed by: EMERGENCY MEDICINE

## 2020-04-06 PROCEDURE — 93005 ELECTROCARDIOGRAM TRACING: CPT | Performed by: EMERGENCY MEDICINE

## 2020-04-06 PROCEDURE — 25010000002 CEFTRIAXONE PER 250 MG: Performed by: INTERNAL MEDICINE

## 2020-04-06 RX ORDER — FINASTERIDE 1 MG/1
1 TABLET, FILM COATED ORAL DAILY
Status: DISCONTINUED | OUTPATIENT
Start: 2020-04-06 | End: 2020-04-06

## 2020-04-06 RX ORDER — ATORVASTATIN CALCIUM 20 MG/1
40 TABLET, FILM COATED ORAL DAILY
Status: DISCONTINUED | OUTPATIENT
Start: 2020-04-06 | End: 2020-04-14 | Stop reason: HOSPADM

## 2020-04-06 RX ORDER — SODIUM CHLORIDE 0.9 % (FLUSH) 0.9 %
10 SYRINGE (ML) INJECTION AS NEEDED
Status: DISCONTINUED | OUTPATIENT
Start: 2020-04-06 | End: 2020-04-14 | Stop reason: HOSPADM

## 2020-04-06 RX ORDER — TAMSULOSIN HYDROCHLORIDE 0.4 MG/1
0.4 CAPSULE ORAL NIGHTLY
Status: DISCONTINUED | OUTPATIENT
Start: 2020-04-06 | End: 2020-04-14 | Stop reason: HOSPADM

## 2020-04-06 RX ORDER — METOPROLOL SUCCINATE 25 MG/1
25 TABLET, EXTENDED RELEASE ORAL DAILY
Status: DISCONTINUED | OUTPATIENT
Start: 2020-04-06 | End: 2020-04-07

## 2020-04-06 RX ORDER — METOPROLOL SUCCINATE 25 MG/1
25 TABLET, EXTENDED RELEASE ORAL DAILY
COMMUNITY

## 2020-04-06 RX ORDER — CEFTRIAXONE SODIUM 1 G/50ML
1 INJECTION, SOLUTION INTRAVENOUS EVERY 24 HOURS
Status: DISCONTINUED | OUTPATIENT
Start: 2020-04-06 | End: 2020-04-07

## 2020-04-06 RX ADMIN — APIXABAN 5 MG: 5 TABLET, FILM COATED ORAL at 20:00

## 2020-04-06 RX ADMIN — ATORVASTATIN CALCIUM 40 MG: 20 TABLET, FILM COATED ORAL at 08:36

## 2020-04-06 RX ADMIN — TAMSULOSIN HYDROCHLORIDE 0.4 MG: 0.4 CAPSULE ORAL at 20:00

## 2020-04-06 RX ADMIN — CEFTRIAXONE SODIUM 1 G: 1 INJECTION, SOLUTION INTRAVENOUS at 07:45

## 2020-04-06 RX ADMIN — APIXABAN 5 MG: 5 TABLET, FILM COATED ORAL at 08:36

## 2020-04-06 RX ADMIN — METOPROLOL SUCCINATE 25 MG: 25 TABLET, EXTENDED RELEASE ORAL at 08:36

## 2020-04-06 NOTE — PROGRESS NOTES
"  PROGRESS NOTE  Patient Name: Akin Chaudhry  Age/Sex: 84 y.o. male  : 1935  MRN: [id number]    Date of Admission: 2020  Date of Encounter Visit: 20   LOS: 0 days   Patient Care Team:  Neda Parker MD as PCP - General (Internal Medicine)    Chief Complaint: Admitted through the ER, acute hypoxemic respiratory failure    Hospital course: Chest admitted, was seen by Dr. Kwok please refer to his note    Interval History: Patient just brought in from the ER    REVIEW OF SYSTEMS:       Ventilator/Non-Invasive Ventilation Settings (From admission, onward)    None            Vital Signs  Temp:  [98.4 °F (36.9 °C)-98.6 °F (37 °C)] 98.4 °F (36.9 °C)  Heart Rate:  [] 82  Resp:  [18] 18  BP: (110-148)/() 119/79  SpO2:  [91 %-94 %] 92 %  on  Flow (L/min):  [4-10] 10 Device (Oxygen Therapy): high-flow nasal cannula  No intake or output data in the 24 hours ending 20 0831  Flowsheet Rows      First Filed Value   Admission Height  177.8 cm (70\") Documented at 2020 0328   Admission Weight  80.1 kg (176 lb 9.6 oz) Documented at 2020 0328        Body mass index is 23.34 kg/m².      20  0328 20  0525   Weight: 80.1 kg (176 lb 9.6 oz) 73.8 kg (162 lb 11.2 oz)       Physical Exam:  Not done    Results Review:      Results from last 7 days   Lab Units 20  0335   SODIUM mmol/L 139   POTASSIUM mmol/L 3.9   CHLORIDE mmol/L 101   CO2 mmol/L 22.7   BUN mg/dL 23   CREATININE mg/dL 1.09   CALCIUM mg/dL 8.9   AST (SGOT) U/L 26   ALT (SGPT) U/L 27   ANION GAP mmol/L 15.3*   ALBUMIN g/dL 3.60     Results from last 7 days   Lab Units 20  0335   TROPONIN T ng/mL <0.010         Results from last 7 days   Lab Units 20  0335   PROBNP pg/mL 850.9     Results from last 7 days   Lab Units 20  0335   WBC 10*3/mm3 7.37   HEMOGLOBIN g/dL 16.4   HEMATOCRIT % 50.0   PLATELETS 10*3/mm3 264   MCV fL 84.2   NEUTROPHIL % % 72.9   LYMPHOCYTE % % 16.4*   MONOCYTES % % 9.0 "   EOSINOPHIL % % 0.9   BASOPHIL % % 0.3   IMM GRAN % % 0.5                   Invalid input(s): LDLCALC  Results from last 7 days   Lab Units 04/06/20  0350   PH, ARTERIAL pH units 7.514*   PCO2, ARTERIAL mm Hg 24.9*   PO2 ART mm Hg 60.2*   HCO3 ART mmol/L 20.0*         Glucose   Date/Time Value Ref Range Status   04/06/2020 0527 115 70 - 130 mg/dL Final     Results from last 7 days   Lab Units 04/06/20  0335   PROCALCITONIN ng/mL 0.11   LACTATE mmol/L 2.4*             Results from last 7 days   Lab Units 04/06/20  0339   ADENOVIRUS DETECTION BY PCR  Not Detected   CORONAVIRUS 229E  Not Detected   CORONAVIRUS HKU1  Not Detected   CORONAVIRUS NL63  Not Detected   CORONAVIRUS OC43  Not Detected   HUMAN METAPNEUMOVIRUS  Not Detected   HUMAN RHINOVIRUS/ENTEROVIRUS  Not Detected   INFLUENZA B PCR  Not Detected   PARAINFLUENZA 1  Not Detected   PARAINFLUENZA VIRUS 2  Not Detected   PARAINFLUENZA VIRUS 3  Not Detected   PARAINFLUENZA VIRUS 4  Not Detected   BORDETELLA PERTUSSIS PCR  Not Detected   BORDETELLA PARAPERTUSSIS PCR  Not Detected   [id number]  Not Detected   CHLAMYDOPHILA PNEUMONIAE PCR  Not Detected   MYCOPLAMA PNEUMO PCR  Not Detected   INFLUENZA A H3  Not Detected   INFLUENZA A H1  Not Detected   RSV, PCR  Not Detected   Results for KALE REILLY (MRN [id number]) as of 4/6/2020 08:23   Ref. Range 4/6/2020 03:35   Ferritin Latest Ref Range: 30.00 - 400.00 ng/mL 1,288.00 (H)   Results for KALE REILLY (MRN [id number]) as of 4/6/2020 08:23   Ref. Range 4/6/2020 03:35   C-Reactive Protein Latest Ref Range: 0.00 - 0.50 mg/dL 11.75 (H)       Imaging:   Imaging Results (All)     Procedure Component Value Units Date/Time    XR Chest AP [[id number]] Collected:  04/06/20 0404     Updated:  04/06/20 0527    Narrative:       PORTABLE CHEST X-RAY     CLINICAL HISTORY: COVID Evaluation, Cough, Fever; J18.9-Pneumonia,  unspecified organism; R68.89-Other general symptoms and signs;  J96.01-Acute respiratory failure  with hypoxia     COMPARISON: 01/23/2019.     FINDINGS: Portable AP view of the chest was obtained with overlying  monitor leads in place. Lungs are well inflated. There is underlying  emphysema and fibrosis. Patchy groundglass opacities have developed  bilaterally, more pronounced in the lung bases, right greater than left  and peripheral perihilar regions and consistent with multifocal  pneumonia. Borderline cardiomegaly. No edema or pleural fluid. Stable  mild aortic ectasia.             Impression:       Emphysema with development of bilateral groundglass  opacities likely multifocal pneumonia        This report was finalized on 4/6/2020 5:24 AM by Juma Panda M.D.             I reviewed the patient's new clinical results.  I personally viewed and interpreted the patient's imaging results: Agree with above        Medication Review:     apixaban 5 mg Oral Q12H   atorvastatin 40 mg Oral Daily   cefTRIAXone 1 g Intravenous Q24H   finasteride 1 mg Oral Daily   metoprolol succinate XL 25 mg Oral Daily   tamsulosin 0.4 mg Oral Nightly            ASSESSMENT:   Right lower lobe pneumonia  Acute hypoxemic respiratory failure  Atrial fibrillation on chronic anticoagulation    PLAN:    Seen by Dr. Bautista, discussed with the ICU rounding team, films and labs independently reviewed  Continue anticoagulation  Continue to biotic with Rocephin, patient already finished a course of Zithromax with no need for atypical coverage  Continue with the metoprolol  Titrate oxygen as needed  Follow-up on the covid19 results and continue with enhanced isolation until the results are available and act accordingly      Disposition: Continue to monitor in the ICU    Gilda Wolf MD  04/06/20  08:31             Dictated utilizing Dragon dictation

## 2020-04-06 NOTE — ED NOTES
"Nursing report ED to floor  Akin Chaudhry  84 y.o.  male    HPI (triage note):   Chief Complaint   Patient presents with   • Shortness of Breath   • Cough       Admitting doctor:   Jesus Bautista MD    Admitting diagnosis:   The primary encounter diagnosis was Pneumonia of both lungs due to infectious organism, unspecified part of lung. Diagnoses of Suspected Covid-19 Virus Infection and Acute respiratory failure with hypoxia (CMS/HCC) were also pertinent to this visit.    Code status:   Current Code Status     Date Active Code Status Order ID Comments User Context       Prior          Allergies:   Buffered aspirin and Bactrim [sulfamethoxazole-trimethoprim]    Weight:       04/06/20 0328   Weight: 80.1 kg (176 lb 9.6 oz)       Most recent vitals:   Vitals:    04/06/20 0324 04/06/20 0328 04/06/20 0329 04/06/20 0413   BP: 143/98  (!) 148/102    BP Location:   Right arm    Patient Position:   Sitting    Pulse: 104   94   Resp:   18    Temp:  98.6 °F (37 °C)     TempSrc:  Oral     SpO2: 92%   93%   Weight:  80.1 kg (176 lb 9.6 oz)     Height:  177.8 cm (70\")         Active LDAs/IV Access:   Lines, Drains & Airways    Active LDAs     Name:   Placement date:   Placement time:   Site:   Days:    Peripheral IV 04/06/20 0326 Left Antecubital   04/06/20 0326    Antecubital   less than 1                Labs (abnormal labs have a star):   Labs Reviewed   COMPREHENSIVE METABOLIC PANEL - Abnormal; Notable for the following components:       Result Value    Glucose 128 (*)     Total Bilirubin 1.6 (*)     Anion Gap 15.3 (*)     All other components within normal limits    Narrative:     GFR Normal >60  Chronic Kidney Disease <60  Kidney Failure <15     LACTATE DEHYDROGENASE - Abnormal; Notable for the following components:     (*)     All other components within normal limits   C-REACTIVE PROTEIN - Abnormal; Notable for the following components:    C-Reactive Protein 11.75 (*)     All other components within normal " "limits   FERRITIN - Abnormal; Notable for the following components:    Ferritin 1,288.00 (*)     All other components within normal limits    Narrative:     Results may be falsely decreased if patient taking Biotin.     CBC WITH AUTO DIFFERENTIAL - Abnormal; Notable for the following components:    RBC 5.94 (*)     Lymphocyte % 16.4 (*)     All other components within normal limits   BLOOD GAS, ARTERIAL - Abnormal; Notable for the following components:    pH, Arterial 7.514 (*)     pCO2, Arterial 24.9 (*)     pO2, Arterial 60.2 (*)     HCO3, Arterial 20.0 (*)     Base Excess, Arterial -0.9 (*)     All other components within normal limits   PROCALCITONIN - Normal    Narrative:     As a Marker for Sepsis (Non-Neonates):   1. <0.5 ng/mL represents a low risk of severe sepsis and/or septic shock.  1. >2 ng/mL represents a high risk of severe sepsis and/or septic shock.    As a Marker for Lower Respiratory Tract Infections that require antibiotic therapy:  PCT on Admission     Antibiotic Therapy             6-12 Hrs later  > 0.5                Strongly Recommended            >0.25 - <0.5         Recommended  0.1 - 0.25           Discouraged                   Remeasure/reassess PCT  <0.1                 Strongly Discouraged          Remeasure/reassess PCT      As 28 day mortality risk marker: \"Change in Procalcitonin Result\" (> 80 % or <=80 %) if Day 0 (or Day 1) and Day 4 values are available. Refer to http://www.[employer]-pct-calculator.com/   Change in PCT <=80 %   A decrease of PCT levels below or equal to 80 % defines a positive change in PCT test result representing a higher risk for 28-day all-cause mortality of patients diagnosed with severe sepsis or septic shock.  Change in PCT > 80 %   A decrease of PCT levels of more than 80 % defines a negative change in PCT result representing a lower risk for 28-day all-cause mortality of patients diagnosed with severe sepsis or septic shock.                Results may be " falsely decreased if patient taking Biotin.    BNP (IN-HOUSE) - Normal    Narrative:     Among patients with dyspnea, NT-proBNP is highly sensitive for the detection of acute congestive heart failure. In addition NT-proBNP of <300 pg/ml effectively rules out acute congestive heart failure with 99% negative predictive value.    Results may be falsely decreased if patient taking Biotin.     TROPONIN (IN-HOUSE) - Normal    Narrative:     Troponin T Reference Range:  <= 0.03 ng/mL-   Negative for AMI  >0.03 ng/mL-     Abnormal for myocardial necrosis.  Clinicians would have to utilize clinical acumen, EKG, Troponin and serial changes to determine if it is an Acute Myocardial Infarction or myocardial injury due to an underlying chronic condition.       Results may be falsely decreased if patient taking Biotin.     RESPIRATORY PANEL, PCR   BLOOD CULTURE   BLOOD CULTURE   CORONAVIRUS(COVID-19),RT-PCR, [facility] LAB, NP/OP SWAB IN TRANSPORT MEDIA   BLOOD GAS, ARTERIAL   LACTIC ACID, PLASMA   CBC AND DIFFERENTIAL    Narrative:     The following orders were created for panel order CBC & Differential.  Procedure                               Abnormality         Status                     ---------                               -----------         ------                     CBC Auto Differential[[id number]]        Abnormal            Final result                 Please view results for these tests on the individual orders.       EKG:   ECG 12 Lead   Preliminary Result   HEART RATE= 93  bpm   RR Interval= 656  ms   MT Interval= 178  ms   P Horizontal Axis= 37  deg   P Front Axis= -11  deg   QRSD Interval= 93  ms   QT Interval= 355  ms   QRS Axis= -38  deg   T Wave Axis= -36  deg   - ABNORMAL ECG -   Sinus rhythm   Multiform ventricular premature complexes   Left ventricular hypertrophy   Inferior infarct, old   Probable anterior infarct, age indeterminate   Electronically Signed By:    Date and Time of Study: 2020-04-06 03:50:50           Meds given in ED:   Medications   sodium chloride 0.9 % flush 10 mL (has no administration in time range)       Imaging results:  Xr Chest Ap    Result Date: 2020  Emphysema with development of bilateral groundglass opacities likely multifocal pneumonia           Ambulatory status:   - with assist    Social issues:   Social History     Socioeconomic History   • Marital status:      Spouse name: Not on file   • Number of children: Not on file   • Years of education: Not on file   • Highest education level: Not on file   Tobacco Use   • Smoking status: Former Smoker     Packs/day: 2.00     Years: 50.00     Pack years: 100.00     Types: Cigarettes     Last attempt to quit:      Years since quittin.2   • Smokeless tobacco: Never Used   Substance and Sexual Activity   • Alcohol use: Yes     Comment: Socially   • Drug use: No   • Sexual activity: Berta Posadas RN  20 0433

## 2020-04-06 NOTE — PROGRESS NOTES
Continued Stay Note  [facility]     Patient Name: Akin Chaudhry  MRN: [id number]  Today's Date: 4/6/2020    Admit Date: 4/6/2020    Discharge Plan     Row Name 04/06/20 1215       Plan    Plan  Undetermined    Plan Comments  Spoke to pt via telephone  He is in isolation and CCP not able to speak with him inn person  He requests CCP follow up tomorrow as he is not feeling well enough to talk  CCP following        Discharge Codes    No documentation.             Jossy Miramontes RN

## 2020-04-06 NOTE — PLAN OF CARE
No resp c/o's today; attempted to wean o2 down to 8 liter hi flow. Sats decreased to 88-89% but no c/o soa; minor cough- non productive; kept npo for possible intubation

## 2020-04-06 NOTE — ED NOTES
Pt transport to ICU on monitor, with mask on patient, ppe on staff.       Berta Kirk RN  04/06/20 0563

## 2020-04-06 NOTE — ED NOTES
Pt via EMS ([facility]. Incident number [id number]) from home with c/o of SOA and productive cough that has been off and on for about a week. He was started on zithromax via PCP last week.   Pt is 92% on 4L NC, 85% RA. Pt is not on home O2.   Pt does have on mask.   This nurse has on mask.      Juan Mcclain RN  04/06/20 0323       Juan Mcclain RN  04/06/20 0411       Juan Mcclain RN  04/06/20 0411

## 2020-04-06 NOTE — ED PROVIDER NOTES
EMERGENCY DEPARTMENT ENCOUNTER    Room Number:  19/19  Date of encounter:  4/6/2020  PCP: Neda Parker MD  Historian: EMS and patient      HPI:  Chief Complaint: Shortness of breath  A complete HPI/ROS/PMH/PSH/SH/FH are unobtainable due to: Nothing    Context: Akin Chaudhry is a 84 y.o. male who presents to the ED c/o worsening shortness of breath and cough along with fever for the last 4 to 5 days.  Patient said he started feeling bad earlier this week, symptoms consisted of subjective fevers and chills along with nonproductive cough and worsening shortness of breath.  He said that he called his PCP a couple days ago and they called him in an antibiotic, he has been taking it and does not recall the name but says he is known not improved.    EMS reports that the scene the patient was in moderate respiratory distress and had O2 sats in the mid 80s.  He was placed on oxygen and transported here.  He arrived with nasal cannula oxygen and a facemask in place    I greeted the patient in full enhanced PPE.  This included a CA ID, gown, gloves, shoe covers.  This was worn during all encounters in the room with this patient.    Patient's not been around anyone that he knows of that has COVID-19, and is not traveled anywhere recently.      PAST MEDICAL HISTORY  Active Ambulatory Problems     Diagnosis Date Noted   • Chronic coronary artery disease 02/03/2016   • Hyperlipidemia 02/03/2016   • Restless legs syndrome 02/03/2016   • Degeneration of intervertebral disc of lumbar region 10/13/2016   • Esophageal dysmotility 10/13/2016   • Gastroesophageal reflux disease 10/13/2016   • Hypertension 10/13/2016   • Chronic venous insufficiency 05/08/2017   • Benign prostatic hyperplasia with urinary frequency 12/13/2017   • Acid reflux 02/01/2018   • Dizziness 03/15/2019   • Vestibular neuritis 03/16/2019     Resolved Ambulatory Problems     Diagnosis Date Noted   • Acute gastritis 02/03/2016   • Osteoarthritis of lumbar spine  with myelopathy 2016   • Urinary frequency 2016   • Acute abdominal pain 2016   • Generalized abdominal pain 2016   • Bladder polyps 2016   • History of colonoscopy 10/13/2016   • 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   • Skin lesion 2017   • Acute prostatitis 2017   • Generalized abdominal pain 2018   • Change in bowel habits 2018   • Constipation 2018     Past Medical History:   Diagnosis Date   • Abdominal pain, suprapubic    • Acute upper respiratory infection    • Foot pain    • HLD (hyperlipidemia)    • Limb pain    • Loss of hair    • Muscular aches    • Pain in soft tissues of limb    • Rash    • Rash          PAST SURGICAL HISTORY  Past Surgical History:   Procedure Laterality Date   • COLONOSCOPY     • COLONOSCOPY N/A 2018    Procedure: COLONOSCOPY TO CECUM WITH COLD POLYPECTOMY;  Surgeon: Jose De Jesus Cardona MD;  Location: [facility] ENDOSCOPY;  Service:    • ENDOSCOPY     • ENDOSCOPY N/A 2018    Procedure: ESOPHAGOGASTRODUODENOSCOPY WITH COLD BIOPSIES;  Surgeon: Jose De Jesus Cardona MD;  Location: [facility] ENDOSCOPY;  Service:    • HERNIA REPAIR           FAMILY HISTORY  Family History   Problem Relation Age of Onset   • Heart disease Brother    • Diabetes Brother          SOCIAL HISTORY  Social History     Socioeconomic History   • Marital status:      Spouse name: Not on file   • Number of children: Not on file   • Years of education: Not on file   • Highest education level: Not on file   Tobacco Use   • Smoking status: Former Smoker     Packs/day: 2.00     Years: 50.00     Pack years: 100.00     Types: Cigarettes     Last attempt to quit:      Years since quittin.2   • Smokeless tobacco: Never Used   Substance and Sexual Activity   • Alcohol use: Yes     Comment: Socially   • Drug use: No   • Sexual activity: Defer         ALLERGIES  Buffered aspirin and Bactrim [sulfamethoxazole-trimethoprim]        REVIEW OF SYSTEMS  Review of Systems     All  systems reviewed and negative except for those discussed in HPI.       PHYSICAL EXAM    I have reviewed the triage vital signs and nursing notes.    ED Triage Vitals   Temp Heart Rate Resp BP SpO2   04/06/20 0328 04/06/20 0324 04/06/20 0329 04/06/20 0324 04/06/20 0324   98.6 °F (37 °C) 104 18 143/98 92 %      Temp src Heart Rate Source Patient Position BP Location FiO2 (%)   04/06/20 0328 -- 04/06/20 0329 04/06/20 0329 --   Oral  Sitting Right arm        Physical Exam  GENERAL: Moderate respiratory distress, dry cough noted.  Can speak in short phrases  HENT: nares patent  EYES: Conjunctiva injected  CV: Atrial fib on the monitor  RESPIRATORY: Moderate respiratory distress, coarse breath sounds heard throughout, accessory muscle use present  ABDOMEN: soft  MUSCULOSKELETAL: no deformity, no pedal edema  NEURO: alert, moves all extremities, follows commands  SKIN: warm, dry        LAB RESULTS  Recent Results (from the past 24 hour(s))   CBC Auto Differential    Collection Time: 04/06/20  3:35 AM   Result Value Ref Range    WBC 7.37 3.40 - 10.80 10*3/mm3    RBC 5.94 (H) 4.14 - 5.80 10*6/mm3    Hemoglobin 16.4 13.0 - 17.7 g/dL    Hematocrit 50.0 37.5 - 51.0 %    MCV 84.2 79.0 - 97.0 fL    MCH 27.6 26.6 - 33.0 pg    MCHC 32.8 31.5 - 35.7 g/dL    RDW 13.9 12.3 - 15.4 %    RDW-SD 42.5 37.0 - 54.0 fl    MPV 9.4 6.0 - 12.0 fL    Platelets 264 140 - 450 10*3/mm3    Neutrophil % 72.9 42.7 - 76.0 %    Lymphocyte % 16.4 (L) 19.6 - 45.3 %    Monocyte % 9.0 5.0 - 12.0 %    Eosinophil % 0.9 0.3 - 6.2 %    Basophil % 0.3 0.0 - 1.5 %    Immature Grans % 0.5 0.0 - 0.5 %    Neutrophils, Absolute 5.37 1.70 - 7.00 10*3/mm3    Lymphocytes, Absolute 1.21 0.70 - 3.10 10*3/mm3    Monocytes, Absolute 0.66 0.10 - 0.90 10*3/mm3    Eosinophils, Absolute 0.07 0.00 - 0.40 10*3/mm3    Basophils, Absolute 0.02 0.00 - 0.20 10*3/mm3    Immature Grans, Absolute 0.04 0.00 - 0.05 10*3/mm3    nRBC 0.0 0.0 - 0.2 /100 WBC       Ordered the above labs and  independently reviewed the results.        RADIOLOGY  No Radiology Exams Resulted Within Past 24 Hours    I ordered the above noted radiological studies. Reviewed by me and discussed with radiologist.  See dictation for official radiology interpretation.      PROCEDURES    Critical Care  Performed by: Jose Vazquez MD  Authorized by: Jose Vazquez MD     Critical care provider statement:     Critical care time (minutes):  35    Critical care time was exclusive of:  Separately billable procedures and treating other patients    Critical care was necessary to treat or prevent imminent or life-threatening deterioration of the following conditions:  Respiratory failure    Critical care was time spent personally by me on the following activities:  Discussions with consultants, evaluation of patient's response to treatment, examination of patient, ordering and performing treatments and interventions, ordering and review of laboratory studies, ordering and review of radiographic studies, pulse oximetry, re-evaluation of patient's condition and review of old charts    I assumed direction of critical care for this patient from another provider in my specialty: no            MEDICATIONS GIVEN IN ER    Medications   sodium chloride 0.9 % flush 10 mL (has no administration in time range)         PROGRESS, DATA ANALYSIS, CONSULTS, AND MEDICAL DECISION MAKING    All labs have been independently reviewed by me.  All radiology studies have been reviewed by me and discussed with radiologist dictating the report.   EKG's independently viewed and interpreted by me.  Discussion below represents my analysis of pertinent findings related to patient's condition, differential diagnosis, treatment plan and final disposition.        ED Course as of Apr 06 0440   Mon Apr 06, 2020   0357 Patient was initially turned up to 8 L high flow nasal cannula, and his breathing improved.  He was able to speak in sentences and his O2 sats came to  the mid 90s.  He is a former heavy smoker, and seems to be comfortable at this level.    [DP]   0400 His chest x-ray shows a very concerning pattern of bilateral patchy pneumonia consistent with COVID-19.  Initially called and spoke with Dr. Bautista from the ICU who agrees to accept the patient in the ICU.  I do not believe he needs to be intubated emergently, but should his symptoms progress he certainly might.    [DP]   0438 Initial ABG shows pH 7.51, PCO2 25, PO2 of 60 on 6 L by high flow nasal cannula    [DP]   0438 EKG at 350  Atrial fibrillation , frequent ectopy, tachycardic  Normal QRS other than Q waves inferiorly  No acute ST segment abnormalities to suggest acute ischemia.        [DP]   0439 White count is normal, mild lymphocytopenia  Chemistry unremarkable except for bilirubin 1.6  CRP, LDH, ferritin all elevated  Pro-Charli and troponin normal      [DP]   0440 Chest x-ray as above shows multiple patchy bilateral interstitial infiltrates concerning for COVID-19    [DP]   0440 Patient remained hemodynamically stable and on 8 L by high flow nasal cannula.  He will go to the ICU for further    [DP]      ED Course User Index  [DP] Jose Vazquez MD               AS OF 04:00 VITALS:    BP - (!) 148/102  HR - 104  TEMP - 98.6 °F (37 °C) (Oral)  O2 SATS - 92%        DIAGNOSIS  Final diagnoses:   Pneumonia of both lungs due to infectious organism, unspecified part of lung   Suspected Covid-19 Virus Infection   Acute respiratory failure with hypoxia (CMS/HCC)         DISPOSITION  Admit to ICU           Jose Vazquez MD  04/06/20 0441

## 2020-04-06 NOTE — H&P
[facility]  [phone]  Jesus Bautista MD      Subjective   LOS: 0 days     84-year-old male who has been having shortness of breath for the past 4 to 5 days.  However last evening patient developed worsening shortness of breath and fell out of bed.  He came into the emergency room.  His chest x-ray shows bilateral infiltrates and he has been admitted for possible Covid-19 infection.  Patient had fevers about 4 to 5 days ago.  He called his primary care physician who gave him oral antibiotics most likely azithromycin.  Patient is unsure of the name.  He states he is coughing up greenish phlegm.  He denies any chest pain.  He reports nausea and some possible emesis.  This was prior to initiation of antibiotics.  No pre-existing lung disease and does not use oxygen or inhalers at home.  He does have atrial fibrillation and is on Eliquis.  He denies kidney disease or diabetes.  Previous smoker 2 packs of cigarettes a day for at least 20 years.  Quit smoking 20 years ago.  However no COPD or asthma recorded.  Denies any wheezing.    Akin Murraysneha  reports that he drinks alcohol.,  reports that he quit smoking about 17 years ago. His smoking use included cigarettes. He has a 100.00 pack-year smoking history. He has never used smokeless tobacco.     Past Hx:  has a past medical history of Abdominal pain, suprapubic, Acute upper respiratory infection, Atrial fibrillation (CMS/HCC), Constipation, Foot pain, HLD (hyperlipidemia), Hypertension, Limb pain, Loss of hair, Muscular aches, Pain in soft tissues of limb, Rash, and Rash.  Surg Hx:  has a past surgical history that includes Hernia repair; Colonoscopy; Esophagogastroduodenoscopy; Colonoscopy (N/A, 2/12/2018); and Esophagogastroduodenoscopy (N/A, 2/12/2018).  FH: family history includes Diabetes in his brother; Heart disease in his brother.  SH:  reports that he quit smoking about 17 years ago. His smoking use included cigarettes. He has a 100.00  pack-year smoking history. He has never used smokeless tobacco. He reports that he drinks alcohol. He reports that he does not use drugs.    Medications Prior to Admission   Medication Sig Dispense Refill Last Dose   • apixaban (ELIQUIS) 5 MG tablet tablet Take 5 mg by mouth 2 (Two) Times a Day.   4/5/2020 at Unknown time   • atorvastatin (LIPITOR) 40 MG tablet Take 1 tablet by mouth Daily. 90 tablet 1 Past Week at Unknown time   • finasteride (PROPECIA) 1 MG tablet TAKE ONE TABLET BY MOUTH ONCE DAILY 90 tablet 1 Past Week at Unknown time   • furosemide (LASIX) 20 MG tablet Take one half tablet by mouth daily. 90 tablet 3 Past Week at Unknown time   • losartan (COZAAR) 100 MG tablet TAKE 1 TABLET EVERY DAY 90 tablet 3 Past Week at Unknown time   • metoprolol succinate XL (TOPROL-XL) 25 MG 24 hr tablet Take 25 mg by mouth Daily.   Past Week at Unknown time   • potassium chloride (K-DUR,KLOR-CON) 20 MEQ CR tablet TAKE ONE TABLET BY MOUTH ONCE DAILY 30 tablet 5 Past Week at Unknown time   • tamsulosin (FLOMAX) 0.4 MG capsule 24 hr capsule Take 1 capsule by mouth Every Night.   Past Week at Unknown time   • aspirin 81 MG tablet Take 81 mg by mouth daily.   3/15/2019 at Unknown time   • hydrochlorothiazide (HYDRODIURIL) 12.5 MG tablet Take 1 tablet by mouth Daily. 90 tablet 3      Allergies   Allergen Reactions   • Buffered Aspirin Other (See Comments)     Upset stomach   • Bactrim [Sulfamethoxazole-Trimethoprim] Rash       Review of Systems   Constitutional: Positive for fever. Negative for chills.   HENT: Negative for congestion, postnasal drip, sore throat and trouble swallowing.    Eyes: Negative for redness and visual disturbance.   Respiratory: Positive for cough and shortness of breath. Negative for wheezing.    Cardiovascular: Negative for chest pain, palpitations and leg swelling.   Gastrointestinal: Positive for nausea. Negative for abdominal pain, diarrhea and vomiting.   Endocrine: Negative for cold  intolerance and heat intolerance.   Genitourinary: Negative for frequency and urgency.   Musculoskeletal: Negative for arthralgias and back pain.   Skin: Negative for pallor and rash.   Neurological: Negative for seizures and headaches.   Psychiatric/Behavioral: Negative for behavioral problems. The patient is not nervous/anxious.      Vital Signs past 24hrs  BP range: BP: (127-148)/() 127/91  Pulse range: Heart Rate:  [] 98  Resp rate range: Resp:  [18] 18  Temp range: Temp (24hrs), Av.5 °F (36.9 °C), Min:98.4 °F (36.9 °C), Max:98.6 °F (37 °C)    Oxygen range: SpO2:  [92 %-94 %] 94 %; Flow (L/min):  [4-10] 10;      73.8 kg (162 lb 11.2 oz); Body mass index is 23.34 kg/m².  No intake/output data recorded.    Adult male who looks comfortable sitting up in bed.  However on 6 L high flow cannula.  Previously dyspneic and in admission room air sats were below 89%.  Pupils equal react light and accommodation.  Oropharynx is moist.  JVP not elevated trachea midline thyroid not enlarged.  Lungs reveal bilateral air entry.  Fine rales in lung bases.  Upper lobes seem clear.  No wheezing.  Percussion note resonant chest expansion equal no chest wall deformity or tenderness.  Heart examination S1-S2 present rhythm irregular no no murmurs.  Rate is within normal limits.  No murmur.  Abdomen is soft nontender bowel sounds present no liver spleen enlargement.  No peripheral cyanosis clubbing.  He moves all 4 extremities sensorimotor intact.  No cervical, axillary, inguinal adenopathy.    Results Review:    I have reviewed the laboratory and imaging data from current admission. My annotations are as noted in assessment and plan.    Medication Review:  I have reviewed the current MAR. My annotations are as noted in assessment and plan.    Plan   PCC Problems  Principal Problem:    Pneumonia of both lungs due to infectious organism  Active Problems:    Permanent atrial fibrillation    Acute respiratory failure with  hypoxia (CMS/[facility])      Plan of Treatment  Currently requiring 6 L oxygen flow.  Covid-19 test is pending.  It is somewhat gratifying the patient's recent fevers have improved or resolved.  We will send a respiratory culture also.  He may have been treated with azithromycin recently.  I will cover with ceftriaxone for typical bacterial pathogens.    He is on oxygen as noted above.  Titrate to maintain sats of 89% or higher.    Permanent atrial fibrillation and on chronic anticoagulation.  This makes pulmonary embolism highly unlikely.  We will continue full anticoagulation for now.    I have resumed his home dose of metoprolol but will hold the losartan for now.    Part of this note may be an electronic transcription/translation of spoken language to printed text using the Dragon Dictation System.

## 2020-04-07 LAB
ALBUMIN SERPL-MCNC: 2.5 G/DL (ref 3.5–5.2)
ALBUMIN/GLOB SERPL: 1 G/DL
ALP SERPL-CCNC: 54 U/L (ref 39–117)
ALT SERPL W P-5'-P-CCNC: 16 U/L (ref 1–41)
ANION GAP SERPL CALCULATED.3IONS-SCNC: 12.1 MMOL/L (ref 5–15)
AST SERPL-CCNC: 16 U/L (ref 1–40)
BILIRUB SERPL-MCNC: 1.1 MG/DL (ref 0.2–1.2)
BUN BLD-MCNC: 17 MG/DL (ref 8–23)
BUN/CREAT SERPL: 25 (ref 7–25)
CALCIUM SPEC-SCNC: 6.9 MG/DL (ref 8.6–10.5)
CHLORIDE SERPL-SCNC: 107 MMOL/L (ref 98–107)
CO2 SERPL-SCNC: 20.9 MMOL/L (ref 22–29)
CREAT BLD-MCNC: 0.68 MG/DL (ref 0.76–1.27)
CRP SERPL-MCNC: 6.4 MG/DL (ref 0–0.5)
DEPRECATED RDW RBC AUTO: 42 FL (ref 37–54)
ERYTHROCYTE [DISTWIDTH] IN BLOOD BY AUTOMATED COUNT: 13.6 % (ref 12.3–15.4)
FERRITIN SERPL-MCNC: 1018 NG/ML (ref 30–400)
GFR SERPL CREATININE-BSD FRML MDRD: 111 ML/MIN/1.73
GLOBULIN UR ELPH-MCNC: 2.4 GM/DL
GLUCOSE BLD-MCNC: 82 MG/DL (ref 65–99)
HCT VFR BLD AUTO: 44.5 % (ref 37.5–51)
HGB BLD-MCNC: 14.8 G/DL (ref 13–17.7)
MAGNESIUM SERPL-MCNC: 1.8 MG/DL (ref 1.6–2.4)
MCH RBC QN AUTO: 27.9 PG (ref 26.6–33)
MCHC RBC AUTO-ENTMCNC: 33.3 G/DL (ref 31.5–35.7)
MCV RBC AUTO: 83.8 FL (ref 79–97)
PLATELET # BLD AUTO: 280 10*3/MM3 (ref 140–450)
PMV BLD AUTO: 9.9 FL (ref 6–12)
POTASSIUM BLD-SCNC: 3.3 MMOL/L (ref 3.5–5.2)
PROCALCITONIN SERPL-MCNC: 0.08 NG/ML (ref 0.1–0.25)
PROT SERPL-MCNC: 4.9 G/DL (ref 6–8.5)
RBC # BLD AUTO: 5.31 10*6/MM3 (ref 4.14–5.8)
REF LAB TEST METHOD: ABNORMAL
SARS-COV-2 RNA RESP QL NAA+PROBE: DETECTED
SODIUM BLD-SCNC: 140 MMOL/L (ref 136–145)
WBC NRBC COR # BLD: 6.7 10*3/MM3 (ref 3.4–10.8)

## 2020-04-07 PROCEDURE — 80053 COMPREHEN METABOLIC PANEL: CPT | Performed by: INTERNAL MEDICINE

## 2020-04-07 PROCEDURE — 84145 PROCALCITONIN (PCT): CPT | Performed by: INTERNAL MEDICINE

## 2020-04-07 PROCEDURE — 86140 C-REACTIVE PROTEIN: CPT | Performed by: INTERNAL MEDICINE

## 2020-04-07 PROCEDURE — 87205 SMEAR GRAM STAIN: CPT | Performed by: INTERNAL MEDICINE

## 2020-04-07 PROCEDURE — 25010000002 CEFTRIAXONE PER 250 MG: Performed by: INTERNAL MEDICINE

## 2020-04-07 PROCEDURE — 82728 ASSAY OF FERRITIN: CPT | Performed by: INTERNAL MEDICINE

## 2020-04-07 PROCEDURE — 87070 CULTURE OTHR SPECIMN AEROBIC: CPT | Performed by: INTERNAL MEDICINE

## 2020-04-07 PROCEDURE — 85027 COMPLETE CBC AUTOMATED: CPT | Performed by: INTERNAL MEDICINE

## 2020-04-07 PROCEDURE — 25010000003 MAGNESIUM SULFATE 4 GM/100ML SOLUTION: Performed by: INTERNAL MEDICINE

## 2020-04-07 PROCEDURE — 83735 ASSAY OF MAGNESIUM: CPT | Performed by: INTERNAL MEDICINE

## 2020-04-07 RX ORDER — MAGNESIUM SULFATE HEPTAHYDRATE 40 MG/ML
4 INJECTION, SOLUTION INTRAVENOUS AS NEEDED
Status: DISCONTINUED | OUTPATIENT
Start: 2020-04-07 | End: 2020-04-14 | Stop reason: HOSPADM

## 2020-04-07 RX ORDER — POTASSIUM CHLORIDE 7.45 MG/ML
10 INJECTION INTRAVENOUS
Status: DISCONTINUED | OUTPATIENT
Start: 2020-04-07 | End: 2020-04-14 | Stop reason: HOSPADM

## 2020-04-07 RX ORDER — MAGNESIUM SULFATE HEPTAHYDRATE 40 MG/ML
2 INJECTION, SOLUTION INTRAVENOUS AS NEEDED
Status: DISCONTINUED | OUTPATIENT
Start: 2020-04-07 | End: 2020-04-14 | Stop reason: HOSPADM

## 2020-04-07 RX ORDER — METOPROLOL SUCCINATE 50 MG/1
50 TABLET, EXTENDED RELEASE ORAL DAILY
Status: DISCONTINUED | OUTPATIENT
Start: 2020-04-08 | End: 2020-04-14 | Stop reason: HOSPADM

## 2020-04-07 RX ORDER — HYDROXYCHLOROQUINE SULFATE 200 MG/1
200 TABLET, FILM COATED ORAL EVERY 8 HOURS SCHEDULED
Status: DISCONTINUED | OUTPATIENT
Start: 2020-04-08 | End: 2020-04-09

## 2020-04-07 RX ORDER — POTASSIUM CHLORIDE 1.5 G/1.77G
40 POWDER, FOR SOLUTION ORAL AS NEEDED
Status: DISCONTINUED | OUTPATIENT
Start: 2020-04-07 | End: 2020-04-14 | Stop reason: HOSPADM

## 2020-04-07 RX ORDER — METOPROLOL SUCCINATE 25 MG/1
25 TABLET, EXTENDED RELEASE ORAL ONCE
Status: COMPLETED | OUTPATIENT
Start: 2020-04-07 | End: 2020-04-07

## 2020-04-07 RX ORDER — FLUTICASONE PROPIONATE 50 MCG
1 SPRAY, SUSPENSION (ML) NASAL 2 TIMES DAILY
Status: DISCONTINUED | OUTPATIENT
Start: 2020-04-08 | End: 2020-04-14 | Stop reason: HOSPADM

## 2020-04-07 RX ORDER — POTASSIUM CHLORIDE 750 MG/1
40 CAPSULE, EXTENDED RELEASE ORAL AS NEEDED
Status: DISCONTINUED | OUTPATIENT
Start: 2020-04-07 | End: 2020-04-14 | Stop reason: HOSPADM

## 2020-04-07 RX ORDER — HYDROXYCHLOROQUINE SULFATE 200 MG/1
600 TABLET, FILM COATED ORAL EVERY 12 HOURS SCHEDULED
Status: COMPLETED | OUTPATIENT
Start: 2020-04-07 | End: 2020-04-08

## 2020-04-07 RX ADMIN — METOPROLOL SUCCINATE 25 MG: 25 TABLET, EXTENDED RELEASE ORAL at 12:35

## 2020-04-07 RX ADMIN — POTASSIUM CHLORIDE 40 MEQ: 750 CAPSULE, EXTENDED RELEASE ORAL at 16:17

## 2020-04-07 RX ADMIN — MAGNESIUM SULFATE 4 G: 4 INJECTION INTRAVENOUS at 12:28

## 2020-04-07 RX ADMIN — METOPROLOL SUCCINATE 25 MG: 25 TABLET, EXTENDED RELEASE ORAL at 08:00

## 2020-04-07 RX ADMIN — POTASSIUM CHLORIDE 40 MEQ: 750 CAPSULE, EXTENDED RELEASE ORAL at 12:28

## 2020-04-07 RX ADMIN — HYDROXYCHLOROQUINE SULFATE 600 MG: 200 TABLET ORAL at 20:34

## 2020-04-07 RX ADMIN — TAMSULOSIN HYDROCHLORIDE 0.4 MG: 0.4 CAPSULE ORAL at 20:34

## 2020-04-07 RX ADMIN — APIXABAN 5 MG: 5 TABLET, FILM COATED ORAL at 20:34

## 2020-04-07 RX ADMIN — APIXABAN 5 MG: 5 TABLET, FILM COATED ORAL at 08:00

## 2020-04-07 RX ADMIN — POTASSIUM CHLORIDE 40 MEQ: 750 CAPSULE, EXTENDED RELEASE ORAL at 07:55

## 2020-04-07 RX ADMIN — CEFTRIAXONE SODIUM 1 G: 1 INJECTION, SOLUTION INTRAVENOUS at 08:00

## 2020-04-07 RX ADMIN — ATORVASTATIN CALCIUM 40 MG: 20 TABLET, FILM COATED ORAL at 08:00

## 2020-04-07 NOTE — PROGRESS NOTES
PROGRESS NOTE  Patient Name: Akin Chaudhry  Age/Sex: 84 y.o. male  : 1935  MRN: [id number]    Date of Admission: 2020  Date of Encounter Visit: 20   LOS: 1 day   Patient Care Team:  Neda Parker MD as PCP - General (Internal Medicine)    Chief Complaint: Admitted with acute hypoxemic respiratory failure, suspected COVID-19 infection    Hospital course: Patient admitted through the ER to the ICU, was started on antibiotic for possible superimposed bacterial infection while waiting on the results of the RT-PCR for the viral COVID-19.Patient had mild lactic acidosis that has corrected with supportive measures, white count is normal, kidney function is looking good with mild hypokalemia, patient is on 8 L/min high flow nasal cannula oxygen.  He was n.p.o. except for clear water and he is asking for something to eat.  No nausea or vomiting, no fever since admission, positive cough, thin secretion, no hemoptysis.  He has atrial fibrillation and is on chronic anticoagulation which was continued    Interval History: No more fever, oxygen requirement at 8 L/min, no nausea or vomiting    REVIEW OF SYSTEMS:   CONSTITUTIONAL: no fever or chills  CARDIOVASCULAR: No chest pain, chest pressure or chest discomfort. No palpitations or edema.   RESPIRATORY: Shortness of breath, nonproductive cough .   GASTROINTESTINAL: No anorexia, nausea, vomiting or diarrhea. No abdominal pain or blood.   HEMATOLOGIC: No bleeding or bruising.     Ventilator/Non-Invasive Ventilation Settings (From admission, onward)    None            Vital Signs  Temp:  [96.9 °F (36.1 °C)-97.5 °F (36.4 °C)] 97.2 °F (36.2 °C)  Heart Rate:  [] 120  Resp:  [20] 20  BP: (104-155)/() 142/94  SpO2:  [85 %-97 %] 88 %  on  Flow (L/min):  [8-10] 8 Device (Oxygen Therapy): high-flow nasal cannula    Intake/Output Summary (Last 24 hours) at 2020 0830  Last data filed at 2020 0700  Gross per 24 hour   Intake 1060 ml   Output 850 ml  "  Net 210 ml     Flowsheet Rows      First Filed Value   Admission Height  177.8 cm (70\") Documented at 04/06/2020 0328   Admission Weight  80.1 kg (176 lb 9.6 oz) Documented at 04/06/2020 0328        Body mass index is 23.25 kg/m².      04/06/20  0328 04/06/20  0525 04/07/20  0700   Weight: 80.1 kg (176 lb 9.6 oz) 73.8 kg (162 lb 11.2 oz) 73.5 kg (162 lb 0.6 oz)       Physical Exam:  GEN:  No acute distress, alert, cooperative, well developed   EYES:   Sclerae clear. No icterus. PERRL. Normal EOM  ENT:   External ears/nose normal, no oral lesions, no thrush, mucous membranes moist  NECK:  Supple, midline trachea, no JVD  LUNGS: Normal chest on inspection, patient has positive crackles bilaterally, right more than left, no expiratory wheezes but minimal rhonchi.  Patient with breathing regularly with nonlabored pattern but while talking he got slightly short of breath  CV: Irregularly irregular rhythm with rapid heart rate . Normal S1/S2. No murmurs, gallops, or rubs noted.  ABD:  Soft, nontender and nondistended. Normal bowel sounds. No guarding  EXT:  Moves all extremities well. No cyanosis. No redness. No edema.   Skin: Dry, intact, no bleeding    Results Review:      Results from last 7 days   Lab Units 04/07/20  0542 04/06/20  0335   SODIUM mmol/L 140 139   POTASSIUM mmol/L 3.3* 3.9   CHLORIDE mmol/L 107 101   CO2 mmol/L 20.9* 22.7   BUN mg/dL 17 23   CREATININE mg/dL 0.68* 1.09   CALCIUM mg/dL 6.9* 8.9   AST (SGOT) U/L 16 26   ALT (SGPT) U/L 16 27   ANION GAP mmol/L 12.1 15.3*   ALBUMIN g/dL 2.50* 3.60     Results from last 7 days   Lab Units 04/06/20  0335   TROPONIN T ng/mL <0.010         Results from last 7 days   Lab Units 04/06/20  0335   PROBNP pg/mL 850.9     Results from last 7 days   Lab Units 04/07/20  0542 04/06/20  0335   WBC 10*3/mm3 6.70 7.37   HEMOGLOBIN g/dL 14.8 16.4   HEMATOCRIT % 44.5 50.0   PLATELETS 10*3/mm3 280 264   MCV fL 83.8 84.2   NEUTROPHIL % %  --  72.9   LYMPHOCYTE % %  --  16.4* "   MONOCYTES % %  --  9.0   EOSINOPHIL % %  --  0.9   BASOPHIL % %  --  0.3   IMM GRAN % %  --  0.5                   Invalid input(s): LDLCALC  Results from last 7 days   Lab Units 04/06/20  0350   PH, ARTERIAL pH units 7.514*   PCO2, ARTERIAL mm Hg 24.9*   PO2 ART mm Hg 60.2*   HCO3 ART mmol/L 20.0*         Glucose   Date/Time Value Ref Range Status   04/06/2020 1609 100 70 - 130 mg/dL Final   04/06/2020 1211 100 70 - 130 mg/dL Final   04/06/2020 0835 107 70 - 130 mg/dL Final   04/06/2020 0527 115 70 - 130 mg/dL Final     Results from last 7 days   Lab Units 04/07/20  0542 04/06/20  0858 04/06/20  0335   PROCALCITONIN ng/mL 0.08*  --  0.11   LACTATE mmol/L  --  1.6 2.4*     Results from last 7 days   Lab Units 04/06/20  0346 04/06/20  0336   BLOODCX  No growth at 24 hours No growth at 24 hours         Results from last 7 days   Lab Units 04/06/20  0339   ADENOVIRUS DETECTION BY PCR  Not Detected   CORONAVIRUS 229E  Not Detected   CORONAVIRUS HKU1  Not Detected   CORONAVIRUS NL63  Not Detected   CORONAVIRUS OC43  Not Detected   HUMAN METAPNEUMOVIRUS  Not Detected   HUMAN RHINOVIRUS/ENTEROVIRUS  Not Detected   INFLUENZA B PCR  Not Detected   PARAINFLUENZA 1  Not Detected   PARAINFLUENZA VIRUS 2  Not Detected   PARAINFLUENZA VIRUS 3  Not Detected   PARAINFLUENZA VIRUS 4  Not Detected   BORDETELLA PERTUSSIS PCR  Not Detected   BORDETELLA PARAPERTUSSIS PCR  Not Detected   [id number]  Not Detected   CHLAMYDOPHILA PNEUMONIAE PCR  Not Detected   MYCOPLAMA PNEUMO PCR  Not Detected   INFLUENZA A H3  Not Detected   INFLUENZA A H1  Not Detected   RSV, PCR  Not Detected               Imaging:   Imaging Results (All)     Procedure Component Value Units Date/Time    XR Chest AP [[id number]] Collected:  04/06/20 0404     Updated:  04/06/20 0527    Narrative:       PORTABLE CHEST X-RAY     CLINICAL HISTORY: COVID Evaluation, Cough, Fever; J18.9-Pneumonia,  unspecified organism; R68.89-Other general symptoms and signs;  J96.01-Acute  respiratory failure with hypoxia     COMPARISON: 01/23/2019.     FINDINGS: Portable AP view of the chest was obtained with overlying  monitor leads in place. Lungs are well inflated. There is underlying  emphysema and fibrosis. Patchy groundglass opacities have developed  bilaterally, more pronounced in the lung bases, right greater than left  and peripheral perihilar regions and consistent with multifocal  pneumonia. Borderline cardiomegaly. No edema or pleural fluid. Stable  mild aortic ectasia.             Impression:       Emphysema with development of bilateral groundglass  opacities likely multifocal pneumonia        This report was finalized on 4/6/2020 5:24 AM by Juma Panda M.D.                      Medication Review:     apixaban 5 mg Oral Q12H   atorvastatin 40 mg Oral Daily   cefTRIAXone 1 g Intravenous Q24H   metoprolol succinate XL 25 mg Oral Daily   tamsulosin 0.4 mg Oral Nightly            ASSESSMENT:   1. Right lower lobe pneumonia, suspected COVID-19   2. Acute hypoxemic respiratory failure  3. Atrial fibrillation on chronic anticoagulation  4. Mild lactic acidosis, resolved  5. Hypokalemia  6. Hypocalcemia  7. Hypoalbuminemia    PLAN:  Patient has negative procalcitonin, no leukocytosis, blood cultures so far are negative for growth , he has risk factors given the underlying cardiac condition, we will consult infectious disease regarding initiating hydroxychloroquine, we will discontinue the antibiotic unless requested to be resumed by infectious disease team (to be called one the COVID infection is confirmed)  Continue with anticoagulation  Continue with the metoprolol, I will increase the dose since the patient is running tachycardia with heart rate in the low 100s  We will advance diet, consider switching back to n.p.o. if he has obvious sign of increased oxygen requirement  Encouraged to sleep in the prone position even while on the nasal cannula oxygen since that has been shown to improve  on the oxygenation overall  Continue with the enhanced droplet and contact isolation until the results of the COVID-19 RT-PCR are available  Replace electrolyte deficit per protocol  Continue to follow-up on the CBC and CMP  Discussed with patient  Prognosis still guarded        Disposition: Continue to monitor in the ICU    Gilda Wolf MD  04/07/20  08:30      Time: Critical care 33 min      Dictated utilizing Dragon dictation

## 2020-04-07 NOTE — PROGRESS NOTES
Adult Nutrition  Assessment/PES    Patient Name:  Akin Chaudhry  YOB: 1935  MRN: [id number]  Admit Date:  4/6/2020    Assessment Date:  4/7/2020    Comments:  Nutrition assessment complete due to MST score of 2. Pt with poor po prior to admit and 15-20 lb weight loss.  Diet adv for lunch, eating and tolerating. Will follow along and support nutritionally as needed.     Reason for Assessment     Row Name 04/07/20 1245          Reason for Assessment    Reason For Assessment  identified at risk by screening criteria     Diagnosis  pulmonary disease;infection/sepsis PNA. R/O Covid-19     Identified At Risk by Screening Criteria  MST SCORE 2+           Anthropometrics     Row Name 04/07/20 1251 04/07/20 0700       Anthropometrics    Weight  --  73.5 kg (162 lb 0.6 oz)       Usual Body Weight (UBW)    Usual Body Weight  73.5 kg (162 lb 0.6 oz)  --    Weight Loss Time Frame  15-20 lb loss  --       Body Mass Index (BMI)    BMI Assessment  BMI 18.5-24.9: normal  --        Labs/Tests/Procedures/Meds     Row Name 04/07/20 1251          Labs/Procedures/Meds    Lab Results Reviewed  reviewed, pertinent     Lab Results Comments  K, alb, crp, ferritin        Diagnostic Tests/Procedures    Diagnostic Test/Procedure Reviewed  reviewed, pertinent        Medications    Pertinent Medications Reviewed  reviewed, pertinent     Pertinent Medications Comments  lipitor         Physical Findings     Row Name 04/07/20 1253          Physical Findings    Overall Physical Appearance  on oxygen therapy     Skin  other (see comments) b-17           Nutrition Prescription Ordered     Row Name 04/07/20 1254          Nutrition Prescription PO    Common Modifiers  Cardiac         Evaluation of Received Nutrient/Fluid Intake     Row Name 04/07/20 1254          PO Evaluation    Number of Days PO Intake Evaluated  Insufficient Data         Problem/Interventions:  Problem 1     Row Name 04/07/20 1254          Nutrition Diagnoses  Problem 1    Problem 1  Inadequate Intake/Infusion     Inadequate Intake Type  Oral     Macronutrient  Kcal;Protein     Signs/Symptoms (evidenced by)  Report of Mnimal PO Intake;Unintended Weight Change     Unintended Weight Change  Loss     Number of Pounds Lost  15-20lbs         Intervention Goal     Row Name 04/07/20 1254          Intervention Goal    General  Maintain nutrition;Reduce/improve symptoms;Meet nutritional needs for age/condition;Disease management/therapy;Improved nutrition related lab(s)     PO  Tolerate PO;PO intake (%)     PO Intake %  80 %     Weight  Maintain weight         Nutrition Intervention     Row Name 04/07/20 1257          Nutrition Intervention    RD/Tech Action  Care plan reviewd;Follow Tx progress;Encourage intake           Education/Evaluation     Row Name 04/07/20 1253          Education    Education  Will Instruct as appropriate        Monitor/Evaluation    Monitor  Per protocol;Skin status;Symptoms;I&O;PO intake;Pertinent labs;Weight           Electronically signed by:  Octavia Durham RD  04/07/20 13:13

## 2020-04-07 NOTE — PLAN OF CARE
Metoprolol increased. Diet advanced to cardiac diet. Voiding per urinal. HR in and out of Afib from 140's nonsustained back down to 80's. 9L highflow, humidified NC.     Problem: Patient Care Overview  Goal: Plan of Care Review  Outcome: Ongoing (interventions implemented as appropriate)  Goal: Individualization and Mutuality  Outcome: Ongoing (interventions implemented as appropriate)  Goal: Discharge Needs Assessment  Outcome: Ongoing (interventions implemented as appropriate)  Goal: Interprofessional Rounds/Family Conf  Outcome: Ongoing (interventions implemented as appropriate)     Problem: Skin Injury Risk (Adult)  Goal: Identify Related Risk Factors and Signs and Symptoms  Outcome: Ongoing (interventions implemented as appropriate)  Goal: Skin Health and Integrity  Outcome: Ongoing (interventions implemented as appropriate)     Problem: Fall Risk (Adult)  Goal: Identify Related Risk Factors and Signs and Symptoms  Outcome: Ongoing (interventions implemented as appropriate)  Goal: Absence of Fall  Outcome: Ongoing (interventions implemented as appropriate)     Problem: Pneumonia (Adult)  Goal: Signs and Symptoms of Listed Potential Problems Will be Absent, Minimized or Managed (Pneumonia)  Outcome: Ongoing (interventions implemented as appropriate)     Problem: Breathing Pattern Ineffective (Adult)  Goal: Identify Related Risk Factors and Signs and Symptoms  Outcome: Ongoing (interventions implemented as appropriate)  Goal: Effective Oxygenation/Ventilation  Outcome: Ongoing (interventions implemented as appropriate)  Goal: Anxiety/Fear Reduction  Outcome: Ongoing (interventions implemented as appropriate)

## 2020-04-07 NOTE — PROGRESS NOTES
Brief ID Note    PT admitted with AHRF and found to be COVID 19+  Reasonable to start HCQ 600mg po q12 x2 then 200mg po q8 to try to prevent further decompensation  EKG okay and will keep on tele  Formal consult to follow

## 2020-04-07 NOTE — PROGRESS NOTES
Discharge Planning Assessment  [facility]     Patient Name: Akin Chaudhry  MRN: [id number]  Today's Date: 4/7/2020    Admit Date: 4/6/2020    Discharge Needs Assessment     Row Name 04/07/20 1324       Living Environment    Lives With  child(eloise), adult;spouse;grandchild(eloise)    Current Living Arrangements  home/apartment/condo    Potentially Unsafe Housing Conditions  unable to assess    Provides Primary Care For  no one    Able to Return to Prior Arrangements  other (see comments)       Resource/Environmental Concerns    Resource/Environmental Concerns  none    Transportation Concerns  car, none       Transition Planning    Patient/Family Anticipates Transition to  home with family    Patient/Family Anticipated Services at Transition  none    Transportation Anticipated  family or friend will provide       Discharge Needs Assessment    Concerns to be Addressed  no discharge needs identified    Equipment Currently Used at Home  none    Anticipated Changes Related to Illness  none    Equipment Needed After Discharge  none        Discharge Plan     Row Name 04/07/20 1329       Plan    Plan Comments  IMM noted.  CCP spoke to patient at bedside to discuss discharge planning..  Face sheet verified. CCP role explained.  Pt 's PCP is Dr Red..  Pt emergency contact is his wife Fernanda [phone],   Pt lives with his wife and other family members.  He plans to return home at [place].  He uses no DME to ambulate.  Pt is independent with ADL's.  He has no past history with Home Health.   He has no rehab history.  He obtains his medications from [facility]  or [facility] mail order  Home if able  CCP following       Row Name 04/07/20 1326       Plan    Plan  Undetermined        Destination      Coordination has not been started for this encounter.      Durable Medical Equipment      Coordination has not been started for this encounter.      Dialysis/Infusion      Coordination has not been started for this encounter.      Home Medical  Care      Coordination has not been started for this encounter.      Therapy      Coordination has not been started for this encounter.      Community Resources      Coordination has not been started for this encounter.          Demographic Summary    No documentation.       Functional Status    No documentation.       Psychosocial    No documentation.       Abuse/Neglect    No documentation.       Legal    No documentation.       Substance Abuse    No documentation.       Patient Forms    No documentation.           Jossy Miramontes RN

## 2020-04-08 LAB
ALBUMIN SERPL-MCNC: 2.7 G/DL (ref 3.5–5.2)
ALBUMIN/GLOB SERPL: 0.9 G/DL
ALP SERPL-CCNC: 64 U/L (ref 39–117)
ALT SERPL W P-5'-P-CCNC: 20 U/L (ref 1–41)
ANION GAP SERPL CALCULATED.3IONS-SCNC: 8.7 MMOL/L (ref 5–15)
AST SERPL-CCNC: 22 U/L (ref 1–40)
BASOPHILS # BLD AUTO: 0.03 10*3/MM3 (ref 0–0.2)
BASOPHILS NFR BLD AUTO: 0.4 % (ref 0–1.5)
BILIRUB SERPL-MCNC: 1.3 MG/DL (ref 0.2–1.2)
BUN BLD-MCNC: 20 MG/DL (ref 8–23)
BUN/CREAT SERPL: 23 (ref 7–25)
CALCIUM SPEC-SCNC: 8 MG/DL (ref 8.6–10.5)
CHLORIDE SERPL-SCNC: 109 MMOL/L (ref 98–107)
CO2 SERPL-SCNC: 22.3 MMOL/L (ref 22–29)
CREAT BLD-MCNC: 0.87 MG/DL (ref 0.76–1.27)
DEPRECATED RDW RBC AUTO: 44.1 FL (ref 37–54)
EOSINOPHIL # BLD AUTO: 0.11 10*3/MM3 (ref 0–0.4)
EOSINOPHIL NFR BLD AUTO: 1.5 % (ref 0.3–6.2)
ERYTHROCYTE [DISTWIDTH] IN BLOOD BY AUTOMATED COUNT: 14.1 % (ref 12.3–15.4)
GFR SERPL CREATININE-BSD FRML MDRD: 84 ML/MIN/1.73
GLOBULIN UR ELPH-MCNC: 3.1 GM/DL
GLUCOSE BLD-MCNC: 110 MG/DL (ref 65–99)
HCT VFR BLD AUTO: 46.1 % (ref 37.5–51)
HGB BLD-MCNC: 15.3 G/DL (ref 13–17.7)
IMM GRANULOCYTES # BLD AUTO: 0.04 10*3/MM3 (ref 0–0.05)
IMM GRANULOCYTES NFR BLD AUTO: 0.6 % (ref 0–0.5)
LYMPHOCYTES # BLD AUTO: 1.06 10*3/MM3 (ref 0.7–3.1)
LYMPHOCYTES NFR BLD AUTO: 14.8 % (ref 19.6–45.3)
MAGNESIUM SERPL-MCNC: 2.4 MG/DL (ref 1.6–2.4)
MCH RBC QN AUTO: 28.4 PG (ref 26.6–33)
MCHC RBC AUTO-ENTMCNC: 33.2 G/DL (ref 31.5–35.7)
MCV RBC AUTO: 85.7 FL (ref 79–97)
MONOCYTES # BLD AUTO: 0.77 10*3/MM3 (ref 0.1–0.9)
MONOCYTES NFR BLD AUTO: 10.8 % (ref 5–12)
NEUTROPHILS # BLD AUTO: 5.15 10*3/MM3 (ref 1.7–7)
NEUTROPHILS NFR BLD AUTO: 71.9 % (ref 42.7–76)
NRBC BLD AUTO-RTO: 0 /100 WBC (ref 0–0.2)
PLATELET # BLD AUTO: 292 10*3/MM3 (ref 140–450)
PMV BLD AUTO: 9.3 FL (ref 6–12)
POTASSIUM BLD-SCNC: 4.8 MMOL/L (ref 3.5–5.2)
PROT SERPL-MCNC: 5.8 G/DL (ref 6–8.5)
RBC # BLD AUTO: 5.38 10*6/MM3 (ref 4.14–5.8)
SODIUM BLD-SCNC: 140 MMOL/L (ref 136–145)
WBC NRBC COR # BLD: 7.16 10*3/MM3 (ref 3.4–10.8)

## 2020-04-08 PROCEDURE — 85025 COMPLETE CBC W/AUTO DIFF WBC: CPT | Performed by: INTERNAL MEDICINE

## 2020-04-08 PROCEDURE — 80053 COMPREHEN METABOLIC PANEL: CPT | Performed by: INTERNAL MEDICINE

## 2020-04-08 PROCEDURE — 83735 ASSAY OF MAGNESIUM: CPT | Performed by: INTERNAL MEDICINE

## 2020-04-08 PROCEDURE — 99223 1ST HOSP IP/OBS HIGH 75: CPT | Performed by: INTERNAL MEDICINE

## 2020-04-08 RX ADMIN — HYDROXYCHLOROQUINE SULFATE 200 MG: 200 TABLET ORAL at 20:10

## 2020-04-08 RX ADMIN — FLUTICASONE PROPIONATE 1 SPRAY: 50 SPRAY, METERED NASAL at 20:10

## 2020-04-08 RX ADMIN — APIXABAN 5 MG: 5 TABLET, FILM COATED ORAL at 08:00

## 2020-04-08 RX ADMIN — APIXABAN 5 MG: 5 TABLET, FILM COATED ORAL at 20:10

## 2020-04-08 RX ADMIN — HYDROXYCHLOROQUINE SULFATE 600 MG: 200 TABLET ORAL at 08:00

## 2020-04-08 RX ADMIN — SODIUM CHLORIDE, PRESERVATIVE FREE 10 ML: 5 INJECTION INTRAVENOUS at 20:10

## 2020-04-08 RX ADMIN — METOPROLOL SUCCINATE 50 MG: 50 TABLET, EXTENDED RELEASE ORAL at 08:00

## 2020-04-08 RX ADMIN — ATORVASTATIN CALCIUM 40 MG: 20 TABLET, FILM COATED ORAL at 08:00

## 2020-04-08 RX ADMIN — FLUTICASONE PROPIONATE 1 SPRAY: 50 SPRAY, METERED NASAL at 03:25

## 2020-04-08 RX ADMIN — FLUTICASONE PROPIONATE 1 SPRAY: 50 SPRAY, METERED NASAL at 08:01

## 2020-04-08 RX ADMIN — TAMSULOSIN HYDROCHLORIDE 0.4 MG: 0.4 CAPSULE ORAL at 20:10

## 2020-04-08 NOTE — PROGRESS NOTES
"  PROGRESS NOTE  Patient Name: Akin Chaudhry  Age/Sex: 84 y.o. male  : 1935  MRN: [id number]    Date of Admission: 2020  Date of Encounter Visit: 20   LOS: 2 days   Patient Care Team:  Neda Parker MD as PCP - General (Internal Medicine)    Chief Complaint: Admitted with acute hypoxemic respiratory failure, suspected COVID-19 infection    Hospital course: Patient admitted through the ER to the ICU, was started on antibiotic for possible superimposed bacterial infection while waiting on the results of the RT-PCR for the viral COVID-19.Patient had mild lactic acidosis that has corrected with supportive measures, white count is normal, kidney function is looking good with mild hypokalemia, on 8 L nasal cannula oxygen yesterday and he is down to 5 L today and seems to be doing clinically better    Interval History: No more fever, oxygen requirement  improved down to 5 L/min, tolerating p.o. diet     REVIEW OF SYSTEMS:   CONSTITUTIONAL: no fever or chills  CARDIOVASCULAR: No chest pain, chest pressure or chest discomfort. No palpitations or edema.   RESPIRATORY: Improving shortness of breath, nonproductive cough, improving.   GASTROINTESTINAL: No anorexia, nausea, vomiting or diarrhea. No abdominal pain or blood.   HEMATOLOGIC: No bleeding or bruising.     Ventilator/Non-Invasive Ventilation Settings (From admission, onward)    None            Vital Signs  Temp:  [95.7 °F (35.4 °C)-97.2 °F (36.2 °C)] 95.7 °F (35.4 °C)  Heart Rate:  [] 73  BP: ()/() 130/89  SpO2:  [87 %-98 %] 93 %  on  Flow (L/min):  [5-9] 5 Device (Oxygen Therapy): high-flow nasal cannula;humidified    Intake/Output Summary (Last 24 hours) at 2020 0845  Last data filed at 2020 0700  Gross per 24 hour   Intake 400 ml   Output 1025 ml   Net -625 ml     Flowsheet Rows      First Filed Value   Admission Height  177.8 cm (70\") Documented at 2020 0328   Admission Weight  80.1 kg (176 lb 9.6 oz) Documented " at 04/06/2020 0328        Body mass index is 23.79 kg/m².      04/06/20  0525 04/07/20  0700 04/08/20  0700   Weight: 73.8 kg (162 lb 11.2 oz) 73.5 kg (162 lb 0.6 oz) 75.2 kg (165 lb 12.6 oz)       Physical Exam:  GEN:  No acute distress, alert, cooperative, well developed   EYES:   Sclerae clear. No icterus. PERRL. Normal EOM  ENT:   External ears/nose normal, no oral lesions, no thrush, mucous membranes moist  NECK:  Supple, midline trachea, no JVD  LUNGS: Normal chest on inspection, patient has positive crackles bilaterally, right more than left, no expiratory wheezes but minimal rhonchi.  Patient with breathing regularly with nonlabored pattern but while talking he got slightly short of breath  CV: Irregularly irregular rhythm with controlled heart rate. Normal S1/S2. No murmurs, gallops, or rubs noted.  ABD:  Soft, nontender and nondistended. Normal bowel sounds. No guarding  EXT:  Moves all extremities well. No cyanosis. No redness. No edema.   Skin: Dry, intact, no bleeding    Results Review:      Results from last 7 days   Lab Units 04/08/20  0328 04/07/20  0542 04/06/20  0335   SODIUM mmol/L 140 140 139   POTASSIUM mmol/L 4.8 3.3* 3.9   CHLORIDE mmol/L 109* 107 101   CO2 mmol/L 22.3 20.9* 22.7   BUN mg/dL 20 17 23   CREATININE mg/dL 0.87 0.68* 1.09   CALCIUM mg/dL 8.0* 6.9* 8.9   AST (SGOT) U/L 22 16 26   ALT (SGPT) U/L 20 16 27   ANION GAP mmol/L 8.7 12.1 15.3*   ALBUMIN g/dL 2.70* 2.50* 3.60     Results from last 7 days   Lab Units 04/06/20  0335   TROPONIN T ng/mL <0.010         Results from last 7 days   Lab Units 04/06/20  0335   PROBNP pg/mL 850.9     Results from last 7 days   Lab Units 04/08/20  0328 04/07/20  0542 04/06/20  0335   WBC 10*3/mm3 7.16 6.70 7.37   HEMOGLOBIN g/dL 15.3 14.8 16.4   HEMATOCRIT % 46.1 44.5 50.0   PLATELETS 10*3/mm3 292 280 264   MCV fL 85.7 83.8 84.2   NEUTROPHIL % % 71.9  --  72.9   LYMPHOCYTE % % 14.8*  --  16.4*   MONOCYTES % % 10.8  --  9.0   EOSINOPHIL % % 1.5  --   0.9   BASOPHIL % % 0.4  --  0.3   IMM GRAN % % 0.6*  --  0.5         Results from last 7 days   Lab Units 04/08/20  0328 04/07/20  0542   MAGNESIUM mg/dL 2.4 1.8           Invalid input(s): LDLCALC  Results from last 7 days   Lab Units 04/06/20  0350   PH, ARTERIAL pH units 7.514*   PCO2, ARTERIAL mm Hg 24.9*   PO2 ART mm Hg 60.2*   HCO3 ART mmol/L 20.0*         Glucose   Date/Time Value Ref Range Status   04/06/2020 1609 100 70 - 130 mg/dL Final   04/06/2020 1211 100 70 - 130 mg/dL Final   04/06/2020 0835 107 70 - 130 mg/dL Final   04/06/2020 0527 115 70 - 130 mg/dL Final     Results from last 7 days   Lab Units 04/07/20  0542 04/06/20  0858 04/06/20  0335   PROCALCITONIN ng/mL 0.08*  --  0.11   LACTATE mmol/L  --  1.6 2.4*     Results from last 7 days   Lab Units 04/06/20  0346 04/06/20  0336   BLOODCX  No growth at 2 days No growth at 2 days         Results from last 7 days   Lab Units 04/06/20  0339   ADENOVIRUS DETECTION BY PCR  Not Detected   CORONAVIRUS 229E  Not Detected   CORONAVIRUS HKU1  Not Detected   CORONAVIRUS NL63  Not Detected   CORONAVIRUS OC43  Not Detected   HUMAN METAPNEUMOVIRUS  Not Detected   HUMAN RHINOVIRUS/ENTEROVIRUS  Not Detected   INFLUENZA B PCR  Not Detected   PARAINFLUENZA 1  Not Detected   PARAINFLUENZA VIRUS 2  Not Detected   PARAINFLUENZA VIRUS 3  Not Detected   PARAINFLUENZA VIRUS 4  Not Detected   BORDETELLA PERTUSSIS PCR  Not Detected   BORDETELLA PARAPERTUSSIS PCR  Not Detected   [id number]  Not Detected   CHLAMYDOPHILA PNEUMONIAE PCR  Not Detected   MYCOPLAMA PNEUMO PCR  Not Detected   INFLUENZA A H3  Not Detected   INFLUENZA A H1  Not Detected   RSV, PCR  Not Detected               Imaging:   Imaging Results (All)     Procedure Component Value Units Date/Time    XR Chest AP [[id number]] Collected:  04/06/20 0404     Updated:  04/06/20 0527    Narrative:       PORTABLE CHEST X-RAY     CLINICAL HISTORY: COVID Evaluation, Cough, Fever; J18.9-Pneumonia,  unspecified organism;  R68.89-Other general symptoms and signs;  J96.01-Acute respiratory failure with hypoxia     COMPARISON: 01/23/2019.     FINDINGS: Portable AP view of the chest was obtained with overlying  monitor leads in place. Lungs are well inflated. There is underlying  emphysema and fibrosis. Patchy groundglass opacities have developed  bilaterally, more pronounced in the lung bases, right greater than left  and peripheral perihilar regions and consistent with multifocal  pneumonia. Borderline cardiomegaly. No edema or pleural fluid. Stable  mild aortic ectasia.             Impression:       Emphysema with development of bilateral groundglass  opacities likely multifocal pneumonia        This report was finalized on 4/6/2020 5:24 AM by Juma Panda M.D.                      Medication Review:     apixaban 5 mg Oral Q12H   atorvastatin 40 mg Oral Daily   fluticasone 1 spray Each Nare BID   hydroxychloroquine 200 mg Oral Q8H   metoprolol succinate XL 50 mg Oral Daily   tamsulosin 0.4 mg Oral Nightly         Pharmacy Consult        ASSESSMENT:   1. Right lower lobe pneumonia, suspected COVID-19   2. Acute hypoxemic respiratory failure  3. Atrial fibrillation on chronic anticoagulation  4. Mild lactic acidosis, resolved  5. Hypokalemia  6. Hypocalcemia  7. Hypoalbuminemia    PLAN:  Started hydroxychloroquine 600 mg every 12 on 4/7/2020 with a plan to go to 200 every 8 hours afterwards, appreciate infectious disease input  Patient has clinical improvement with improvement in the oxygen requirements and we will transfer to a stepdown COVID19 unit  Continue with the metoprolol, the dose was increased yesterday with optimal improvement on the heart rate and the blood pressure control  Continue to titrate oxygen as needed maintaining sats above 90%  Continue with a daily CBC and CMP    Discussed with the patient, discussed with nursing staff  Summary of recommendations:  · Continue with the hydroxychloroquine, today is day  #2  · Continue with anticoagulation  · Continue with the metoprolol at the higher dose of 50 mg p.o. daily  · Transfer to a stepdown COVID 19 unit  · Continue with the daily labs        Disposition: Stepdown unit, hopefully home soon if he continues to improve    Gilda Wolf MD  04/08/20  08:45          Dictated utilizing Dragon dictation

## 2020-04-08 NOTE — PLAN OF CARE
Problem: Patient Care Overview  Goal: Plan of Care Review  Outcome: Ongoing (interventions implemented as appropriate)  Flowsheets (Taken 4/8/2020 1630)  Progress: improving  Plan of Care Reviewed With: patient  Outcome Summary: Pt. transferred from ICU today on 6 liters HFO2. No fever at this time and no complaints of pain. COVID positive on Plaquenil q8h.  SR-ST w/afib at times.  Vitals otherwise stable.  Will continue to monitor.     Problem: Fall Risk (Adult)  Goal: Identify Related Risk Factors and Signs and Symptoms  Outcome: Ongoing (interventions implemented as appropriate)

## 2020-04-08 NOTE — CONSULTS
Referring Provider: Jesus Bautista MD  Reason for Consultation:   Chief Complaint   Patient presents with   • Shortness of Breath   • Cough         Subjective   History of present illness:   This very nice 84-year-old gentleman we are asked to provide evaluation opinion regarding COVID-19.    Patient reports that he was a usual state of health until approximately 3 weeks ago when he developed shortness of breath.  Initially this was mild but progressed over the next several weeks.  He denies any associated fevers or chills or night sweats.  He feels his breathing acutely worse than on or about April 1.  Given deterioration in his symptoms he presented to the [facility] emergency department 4/6/2020.  Unfortunately his COVID-19 testing returned positive and he was admitted to the ICU where he was placed on supplemental oxygen.  He has not required intubation but has required up to 10 L of nasal cannula.  Yesterday we started him on Plaquenil and feels he is better today.    Past medical history: Atrial fibrillation, hyperlipidemia, hypertension, hernia repair  No family history of infectious diseases  Social history: He lives in [place], [place] with his wife, child and grandchildren.  Former smoker      Review of Systems  Pertinent items are noted in HPI, all other systems reviewed and negative    Objective     Physical Exam:   Vital Signs   Temp:  [95.7 °F (35.4 °C)-97.2 °F (36.2 °C)] 95.7 °F (35.4 °C)  Heart Rate:  [] 80  BP: ()/() 98/66    GENERAL: Awake and alert, ill but nontoxic  HEENT: Oropharynx is clear. Hearing is grossly normal.   EYES: PERRL. No conjunctival injection. No lid lag.   LYMPHATICS: No lymphadenopathy of the neck or inguinal regions.   HEART: Regular rate and regular rhythm. No peripheral edema.   LUNGS: Left greater than right rales with normal respiratory effort.   GI: Soft, nontender, nondistended. No appreciable organomegaly.   SKIN: Warm and dry without  cutaneous eruptions   PSYCHIATRIC: Appropriate mood, affect, insight, and judgment.     Results Review:     Creatinine 0.87  Total bilirubin 1.3  White count 7.16  Hemoglobin 15  Platelets 292    CRP 6.4  Ferritin 1018  Estimated Creatinine Clearance: 67.2 mL/min (by C-G formula based on SCr of 0.87 mg/dL).      Microbiology:  4/6/2020 blood cultures 2/2 no growth to date  4/8/2020 respiratory cultures no growth  4/6/2020 NP swab: COVID-19 positive    Radiology:  Chest x-ray dependently interpreted: Left greater than right perihilar patchy opacities  EKG shows sinus rhythm with a QTc interval of 438  Assessment/Plan   1.  COVID-19 pneumonia  2.  Acute hypoxic respiratory failure    We initiated him on Plaquenil yesterday and we are already seeing some improvement in his respiratory status.  He has gone from 10 L down to 5 L nasal cannula  We will continue 200 mg p.o. every 8 hours  Check CRP in a.m.           Thank you for this consult.  We will continue to follow along and tailor antibiotics as the patient's clinical course evolves.      Bryan Ma MD  04/08/20  11:17 AM

## 2020-04-09 LAB
ALBUMIN SERPL-MCNC: 2.8 G/DL (ref 3.5–5.2)
ALBUMIN/GLOB SERPL: 0.9 G/DL
ALP SERPL-CCNC: 64 U/L (ref 39–117)
ALT SERPL W P-5'-P-CCNC: 20 U/L (ref 1–41)
ANION GAP SERPL CALCULATED.3IONS-SCNC: 10.9 MMOL/L (ref 5–15)
AST SERPL-CCNC: 20 U/L (ref 1–40)
BACTERIA SPEC RESP CULT: NORMAL
BASOPHILS # BLD AUTO: 0.03 10*3/MM3 (ref 0–0.2)
BASOPHILS NFR BLD AUTO: 0.4 % (ref 0–1.5)
BILIRUB SERPL-MCNC: 1.2 MG/DL (ref 0.2–1.2)
BUN BLD-MCNC: 17 MG/DL (ref 8–23)
BUN/CREAT SERPL: 19.8 (ref 7–25)
CALCIUM SPEC-SCNC: 8.3 MG/DL (ref 8.6–10.5)
CHLORIDE SERPL-SCNC: 103 MMOL/L (ref 98–107)
CO2 SERPL-SCNC: 23.1 MMOL/L (ref 22–29)
CREAT BLD-MCNC: 0.86 MG/DL (ref 0.76–1.27)
CRP SERPL-MCNC: 2.46 MG/DL (ref 0–0.5)
DEPRECATED RDW RBC AUTO: 41.3 FL (ref 37–54)
EOSINOPHIL # BLD AUTO: 0.14 10*3/MM3 (ref 0–0.4)
EOSINOPHIL NFR BLD AUTO: 2.1 % (ref 0.3–6.2)
ERYTHROCYTE [DISTWIDTH] IN BLOOD BY AUTOMATED COUNT: 13.5 % (ref 12.3–15.4)
GFR SERPL CREATININE-BSD FRML MDRD: 85 ML/MIN/1.73
GLOBULIN UR ELPH-MCNC: 3 GM/DL
GLUCOSE BLD-MCNC: 88 MG/DL (ref 65–99)
GRAM STN SPEC: NORMAL
GRAM STN SPEC: NORMAL
HCT VFR BLD AUTO: 44 % (ref 37.5–51)
HGB BLD-MCNC: 14.7 G/DL (ref 13–17.7)
IMM GRANULOCYTES # BLD AUTO: 0.04 10*3/MM3 (ref 0–0.05)
IMM GRANULOCYTES NFR BLD AUTO: 0.6 % (ref 0–0.5)
LYMPHOCYTES # BLD AUTO: 1.23 10*3/MM3 (ref 0.7–3.1)
LYMPHOCYTES NFR BLD AUTO: 18.1 % (ref 19.6–45.3)
MCH RBC QN AUTO: 27.7 PG (ref 26.6–33)
MCHC RBC AUTO-ENTMCNC: 33.4 G/DL (ref 31.5–35.7)
MCV RBC AUTO: 83 FL (ref 79–97)
MONOCYTES # BLD AUTO: 0.75 10*3/MM3 (ref 0.1–0.9)
MONOCYTES NFR BLD AUTO: 11 % (ref 5–12)
NEUTROPHILS # BLD AUTO: 4.6 10*3/MM3 (ref 1.7–7)
NEUTROPHILS NFR BLD AUTO: 67.8 % (ref 42.7–76)
NRBC BLD AUTO-RTO: 0 /100 WBC (ref 0–0.2)
PLATELET # BLD AUTO: 322 10*3/MM3 (ref 140–450)
PMV BLD AUTO: 9.3 FL (ref 6–12)
POTASSIUM BLD-SCNC: 4.4 MMOL/L (ref 3.5–5.2)
PROT SERPL-MCNC: 5.8 G/DL (ref 6–8.5)
RBC # BLD AUTO: 5.3 10*6/MM3 (ref 4.14–5.8)
SODIUM BLD-SCNC: 137 MMOL/L (ref 136–145)
WBC NRBC COR # BLD: 6.79 10*3/MM3 (ref 3.4–10.8)

## 2020-04-09 PROCEDURE — 80053 COMPREHEN METABOLIC PANEL: CPT | Performed by: INTERNAL MEDICINE

## 2020-04-09 PROCEDURE — 86140 C-REACTIVE PROTEIN: CPT | Performed by: INTERNAL MEDICINE

## 2020-04-09 PROCEDURE — 85025 COMPLETE CBC W/AUTO DIFF WBC: CPT | Performed by: INTERNAL MEDICINE

## 2020-04-09 PROCEDURE — 99232 SBSQ HOSP IP/OBS MODERATE 35: CPT | Performed by: INTERNAL MEDICINE

## 2020-04-09 RX ORDER — HYDROXYCHLOROQUINE SULFATE 200 MG/1
200 TABLET, FILM COATED ORAL EVERY 12 HOURS SCHEDULED
Status: DISCONTINUED | OUTPATIENT
Start: 2020-04-09 | End: 2020-04-11

## 2020-04-09 RX ADMIN — APIXABAN 5 MG: 5 TABLET, FILM COATED ORAL at 08:17

## 2020-04-09 RX ADMIN — APIXABAN 5 MG: 5 TABLET, FILM COATED ORAL at 20:12

## 2020-04-09 RX ADMIN — TAMSULOSIN HYDROCHLORIDE 0.4 MG: 0.4 CAPSULE ORAL at 20:12

## 2020-04-09 RX ADMIN — FLUTICASONE PROPIONATE 1 SPRAY: 50 SPRAY, METERED NASAL at 08:17

## 2020-04-09 RX ADMIN — METOPROLOL SUCCINATE 50 MG: 50 TABLET, EXTENDED RELEASE ORAL at 08:17

## 2020-04-09 RX ADMIN — HYDROXYCHLOROQUINE SULFATE 200 MG: 200 TABLET ORAL at 07:41

## 2020-04-09 RX ADMIN — HYDROXYCHLOROQUINE SULFATE 200 MG: 200 TABLET ORAL at 20:12

## 2020-04-09 RX ADMIN — FLUTICASONE PROPIONATE 1 SPRAY: 50 SPRAY, METERED NASAL at 20:11

## 2020-04-09 RX ADMIN — ATORVASTATIN CALCIUM 40 MG: 20 TABLET, FILM COATED ORAL at 08:17

## 2020-04-09 NOTE — PLAN OF CARE
Problem: Patient Care Overview  Goal: Plan of Care Review  Outcome: Ongoing (interventions implemented as appropriate)  Flowsheets  Taken 4/9/2020 1359  Progress: improving  Outcome Summary: Down to 3L of oxygen today from 5L, labs are looking good, up to chair TID, and possibly home soon.  Taken 4/9/2020 0800  Plan of Care Reviewed With: patient  Goal: Individualization and Mutuality  Outcome: Ongoing (interventions implemented as appropriate)  Goal: Discharge Needs Assessment  Outcome: Ongoing (interventions implemented as appropriate)  Goal: Interprofessional Rounds/Family Conf  Outcome: Ongoing (interventions implemented as appropriate)     Problem: Skin Injury Risk (Adult)  Goal: Identify Related Risk Factors and Signs and Symptoms  Description  Related risk factors and signs and symptoms are identified upon initiation of Human Response Clinical Practice Guideline (CPG).  Outcome: Ongoing (interventions implemented as appropriate)  Goal: Skin Health and Integrity  Description  Patient will demonstrate the desired outcomes by discharge/transition of care.  Outcome: Ongoing (interventions implemented as appropriate)     Problem: Fall Risk (Adult)  Goal: Identify Related Risk Factors and Signs and Symptoms  Description  Related risk factors and signs and symptoms are identified upon initiation of Human Response Clinical Practice Guideline (CPG).  Outcome: Ongoing (interventions implemented as appropriate)  Goal: Absence of Fall  Description  Patient will demonstrate the desired outcomes by discharge/transition of care.  Outcome: Ongoing (interventions implemented as appropriate)     Problem: Pneumonia (Adult)  Goal: Signs and Symptoms of Listed Potential Problems Will be Absent, Minimized or Managed (Pneumonia)  Description  Signs and symptoms of listed potential problems will be absent, minimized or managed by discharge/transition of care (reference Pneumonia (Adult) CPG).  Outcome: Ongoing (interventions  implemented as appropriate)     Problem: Breathing Pattern Ineffective (Adult)  Goal: Identify Related Risk Factors and Signs and Symptoms  Description  Related risk factors and signs and symptoms are identified upon initiation of Human Response Clinical Practice Guideline (CPG).  Outcome: Ongoing (interventions implemented as appropriate)  Goal: Effective Oxygenation/Ventilation  Description  Patient will demonstrate the desired outcomes by discharge/transition of care.  Outcome: Ongoing (interventions implemented as appropriate)  Goal: Anxiety/Fear Reduction  Description  Patient will demonstrate the desired outcomes by discharge/transition of care.  Outcome: Ongoing (interventions implemented as appropriate)

## 2020-04-09 NOTE — PROGRESS NOTES
"INFECTIOUS DISEASES PROGRESS NOTE    CC: f/u COVID    S:   D/w RN and patient stable on 4L  Breathing is \"about the same\"  No f/c/ns. Eating and drinking okay    O:  Physical Exam:  Temp:  [95.5 °F (35.3 °C)-97.2 °F (36.2 °C)] 97.1 °F (36.2 °C)  Heart Rate:  [67-82] 75  Resp:  [16-18] 18  BP: ()/(66-93) 153/93  Physical Exam  No acute distress, appropriate mood and affect, speech clear and coherent.  Rest of exam deferred due to strict isolation precautions need to preserve PPE   Diagnostics:    Creatinine 0.86  CRP 2.46  White count 6.79, 60% neutrophils, 18% of size, 11% monocytes  Hemoglobin 14.7      Assessment/Plan   1.  COVID-19 pneumonia  2.  Acute hypoxic respiratory failure     Doing well.  We will change plaquenil to BID dosing and plan for three more days  Trying to wean oxygen but maybe candidate to d/c on home O2 if able.    Bryan Ma MD  9:36 AM  04/09/20         "

## 2020-04-09 NOTE — PROGRESS NOTES
"  PROGRESS NOTE  Patient Name: Akin Chaudhry  Age/Sex: 84 y.o. male  : 1935  MRN: [id number]    Date of Admission: 2020  Date of Encounter Visit: 20   LOS: 3 days   Patient Care Team:  Neda Parker MD as PCP - General (Internal Medicine)    Chief Complaint: Admitted with acute hypoxemic respiratory failure, suspected COVID-19 infection    Hospital course: Patient admitted through the ER to the ICU, was started on antibiotic for possible superimposed bacterial infection while waiting on the results of the RT-PCR for the viral COVID-19.Patient had mild lactic acidosis that has corrected with supportive measures, white count is normal, kidney function is looking good with mild hypokalemia, on 8 L nasal cannula oxygen yesterday and he is down to 2 L today and seems to be doing clinically better    Interval History: No more fever, oxygen requirement  improved down to 2 L/min, tolerating p.o. diet     REVIEW OF SYSTEMS:   CONSTITUTIONAL: no fever or chills  CARDIOVASCULAR: No chest pain, chest pressure or chest discomfort. No palpitations or edema.   RESPIRATORY: Improving shortness of breath, nonproductive cough, improving.   GASTROINTESTINAL: No anorexia, nausea, vomiting or diarrhea. No abdominal pain or blood.   HEMATOLOGIC: No bleeding or bruising.     Ventilator/Non-Invasive Ventilation Settings (From admission, onward)    2 L/min nasal cannula oxygen            Vital Signs  Temp:  [96.2 °F (35.7 °C)-97.1 °F (36.2 °C)] 97.1 °F (36.2 °C)  Heart Rate:  [67-75] 75  Resp:  [16-18] 18  BP: (152-153)/(90-93) 153/93  SpO2:  [86 %-95 %] 95 %  on  Flow (L/min):  [4-7] 4 Device (Oxygen Therapy): nasal cannula    Intake/Output Summary (Last 24 hours) at 2020 1436  Last data filed at 2020 1049  Gross per 24 hour   Intake 560 ml   Output 450 ml   Net 110 ml     Flowsheet Rows      First Filed Value   Admission Height  177.8 cm (70\") Documented at 2020 0328   Admission Weight  80.1 kg (176 lb " 9.6 oz) Documented at 04/06/2020 0328        Body mass index is 23.79 kg/m².      04/06/20  0525 04/07/20  0700 04/08/20  0700   Weight: 73.8 kg (162 lb 11.2 oz) 73.5 kg (162 lb 0.6 oz) 75.2 kg (165 lb 12.6 oz)       Physical Exam:  GEN:  No acute distress, alert, cooperative, well developed   EYES:   Sclerae clear. No icterus. PERRL. Normal EOM  ENT:   External ears/nose normal, no oral lesions, no thrush, mucous membranes moist  NECK:  Supple, midline trachea, no JVD  LUNGS: Normal chest on inspection, patient has positive crackles bilaterally, right more than left, no expiratory wheezes but minimal rhonchi.  Patient with breathing regularly with nonlabored pattern but while talking he got slightly short of breath  CV: Irregularly irregular rhythm with controlled heart rate. Normal S1/S2. No murmurs, gallops, or rubs noted.  ABD:  Soft, nontender and nondistended. Normal bowel sounds. No guarding  EXT:  Moves all extremities well. No cyanosis. No redness. No edema.   Skin: Dry, intact, no bleeding    Results Review:      Results from last 7 days   Lab Units 04/09/20  0430 04/08/20  0328 04/07/20  0542 04/06/20  0335   SODIUM mmol/L 137 140 140 139   POTASSIUM mmol/L 4.4 4.8 3.3* 3.9   CHLORIDE mmol/L 103 109* 107 101   CO2 mmol/L 23.1 22.3 20.9* 22.7   BUN mg/dL 17 20 17 23   CREATININE mg/dL 0.86 0.87 0.68* 1.09   CALCIUM mg/dL 8.3* 8.0* 6.9* 8.9   AST (SGOT) U/L 20 22 16 26   ALT (SGPT) U/L 20 20 16 27   ANION GAP mmol/L 10.9 8.7 12.1 15.3*   ALBUMIN g/dL 2.80* 2.70* 2.50* 3.60     Results from last 7 days   Lab Units 04/06/20  0335   TROPONIN T ng/mL <0.010         Results from last 7 days   Lab Units 04/06/20  0335   PROBNP pg/mL 850.9     Results from last 7 days   Lab Units 04/09/20  0430 04/08/20  0328 04/07/20  0542 04/06/20  0335   WBC 10*3/mm3 6.79 7.16 6.70 7.37   HEMOGLOBIN g/dL 14.7 15.3 14.8 16.4   HEMATOCRIT % 44.0 46.1 44.5 50.0   PLATELETS 10*3/mm3 322 292 280 264   MCV fL 83.0 85.7 83.8 84.2    NEUTROPHIL % % 67.8 71.9  --  72.9   LYMPHOCYTE % % 18.1* 14.8*  --  16.4*   MONOCYTES % % 11.0 10.8  --  9.0   EOSINOPHIL % % 2.1 1.5  --  0.9   BASOPHIL % % 0.4 0.4  --  0.3   IMM GRAN % % 0.6* 0.6*  --  0.5         Results from last 7 days   Lab Units 04/08/20  0328 04/07/20  0542   MAGNESIUM mg/dL 2.4 1.8           Invalid input(s): LDLCALC  Results from last 7 days   Lab Units 04/06/20  0350   PH, ARTERIAL pH units 7.514*   PCO2, ARTERIAL mm Hg 24.9*   PO2 ART mm Hg 60.2*   HCO3 ART mmol/L 20.0*         Glucose   Date/Time Value Ref Range Status   04/06/2020 1609 100 70 - 130 mg/dL Final     Results from last 7 days   Lab Units 04/07/20  0542 04/06/20  0858 04/06/20  0335   PROCALCITONIN ng/mL 0.08*  --  0.11   LACTATE mmol/L  --  1.6 2.4*     Results from last 7 days   Lab Units 04/07/20  0805 04/06/20  0346 04/06/20  0336   BLOODCX   --  No growth at 3 days No growth at 3 days   RESPCX  Light growth (2+) Normal Respiratory Ariana  --   --          Results from last 7 days   Lab Units 04/06/20  0339   ADENOVIRUS DETECTION BY PCR  Not Detected   CORONAVIRUS 229E  Not Detected   CORONAVIRUS HKU1  Not Detected   CORONAVIRUS NL63  Not Detected   CORONAVIRUS OC43  Not Detected   HUMAN METAPNEUMOVIRUS  Not Detected   HUMAN RHINOVIRUS/ENTEROVIRUS  Not Detected   INFLUENZA B PCR  Not Detected   PARAINFLUENZA 1  Not Detected   PARAINFLUENZA VIRUS 2  Not Detected   PARAINFLUENZA VIRUS 3  Not Detected   PARAINFLUENZA VIRUS 4  Not Detected   BORDETELLA PERTUSSIS PCR  Not Detected   BORDETELLA PARAPERTUSSIS PCR  Not Detected   [id number]  Not Detected   CHLAMYDOPHILA PNEUMONIAE PCR  Not Detected   MYCOPLAMA PNEUMO PCR  Not Detected   INFLUENZA A H3  Not Detected   INFLUENZA A H1  Not Detected   RSV, PCR  Not Detected               Imaging:   Imaging Results (All)     Procedure Component Value Units Date/Time    XR Chest AP [[id number]] Collected:  04/06/20 0404     Updated:  04/06/20 0527    Narrative:       PORTABLE CHEST  X-RAY     CLINICAL HISTORY: COVID Evaluation, Cough, Fever; J18.9-Pneumonia,  unspecified organism; R68.89-Other general symptoms and signs;  J96.01-Acute respiratory failure with hypoxia     COMPARISON: 01/23/2019.     FINDINGS: Portable AP view of the chest was obtained with overlying  monitor leads in place. Lungs are well inflated. There is underlying  emphysema and fibrosis. Patchy groundglass opacities have developed  bilaterally, more pronounced in the lung bases, right greater than left  and peripheral perihilar regions and consistent with multifocal  pneumonia. Borderline cardiomegaly. No edema or pleural fluid. Stable  mild aortic ectasia.             Impression:       Emphysema with development of bilateral groundglass  opacities likely multifocal pneumonia        This report was finalized on 4/6/2020 5:24 AM by Juma Panda M.D.                      Medication Review:     apixaban 5 mg Oral Q12H   atorvastatin 40 mg Oral Daily   fluticasone 1 spray Each Nare BID   hydroxychloroquine 200 mg Oral Q12H   metoprolol succinate XL 50 mg Oral Daily   tamsulosin 0.4 mg Oral Nightly            ASSESSMENT:   1. Right lower lobe pneumonia, suspected COVID-19   2. Acute hypoxemic respiratory failure  3. Atrial fibrillation on chronic anticoagulation  4. Mild lactic acidosis, resolved  5. Hypokalemia  6. Hypocalcemia  7. Hypoalbuminemia    PLAN:  Started hydroxychloroquine 600 mg every 12 on 4/7/2020 with a plan to go to 200 every 8 hours afterwards,  Patient has clinical improvement with improvement in the oxygen requirements and should be ready to transfer home hopefully tomorrow seems to be tolerating the higher dose of metoprolol very well and will continue with the same dose  Check for oxygen requirement and may discharge home on oxygen in 1 to 2 days depending on his reassessment    Discussed with the patient, discussed with nursing staff  Summary of recommendations:  · Continue with the hydroxychloroquine,  dose was changed to twice daily for 3 more days by ID  · Continue with anticoagulation  · Continue with the metoprolol at the higher dose of 50 mg p.o. daily  · Check on oxygen requirement tomorrow and arrange for discharge depending on the overall status  · Discontinue daily labs        Disposition: Home in 1 to 2 days    Gilda Wolf MD  04/09/20  14:36          Dictated utilizing Dragon dictation

## 2020-04-09 NOTE — NURSING NOTE
Patient has had much improvement overnight , This RN decreased from 7 L High flow NC to 5 High flow NC, patients resp. Status remained stable and no signs of ditress , O2 sat, remained above 90 the entire shift , staying around 92-93% .. Pt is on Plaquenil PO ...   Vitals remained stable overnight , NSR , no complaints noted , report given top dayshift RN -[place]

## 2020-04-10 PROCEDURE — 99232 SBSQ HOSP IP/OBS MODERATE 35: CPT | Performed by: INTERNAL MEDICINE

## 2020-04-10 RX ADMIN — HYDROXYCHLOROQUINE SULFATE 200 MG: 200 TABLET ORAL at 08:22

## 2020-04-10 RX ADMIN — APIXABAN 5 MG: 5 TABLET, FILM COATED ORAL at 21:06

## 2020-04-10 RX ADMIN — HYDROXYCHLOROQUINE SULFATE 200 MG: 200 TABLET ORAL at 21:06

## 2020-04-10 RX ADMIN — TAMSULOSIN HYDROCHLORIDE 0.4 MG: 0.4 CAPSULE ORAL at 21:06

## 2020-04-10 RX ADMIN — APIXABAN 5 MG: 5 TABLET, FILM COATED ORAL at 08:22

## 2020-04-10 RX ADMIN — METOPROLOL SUCCINATE 50 MG: 50 TABLET, EXTENDED RELEASE ORAL at 08:22

## 2020-04-10 RX ADMIN — FLUTICASONE PROPIONATE 1 SPRAY: 50 SPRAY, METERED NASAL at 21:06

## 2020-04-10 RX ADMIN — ATORVASTATIN CALCIUM 40 MG: 20 TABLET, FILM COATED ORAL at 08:23

## 2020-04-10 RX ADMIN — FLUTICASONE PROPIONATE 1 SPRAY: 50 SPRAY, METERED NASAL at 08:22

## 2020-04-10 NOTE — PLAN OF CARE
Problem: Patient Care Overview  Goal: Plan of Care Review  Outcome: Ongoing (interventions implemented as appropriate)  Note:   Pt. Is currently on 2.5 liters of oxygen. Pt has had some complaints of SOA. Possible d/c tomorrow, VSS, will continue to monitor.   Goal: Individualization and Mutuality  Outcome: Ongoing (interventions implemented as appropriate)  Goal: Discharge Needs Assessment  Outcome: Ongoing (interventions implemented as appropriate)  Goal: Interprofessional Rounds/Family Conf  Outcome: Ongoing (interventions implemented as appropriate)     Problem: Skin Injury Risk (Adult)  Goal: Identify Related Risk Factors and Signs and Symptoms  Outcome: Ongoing (interventions implemented as appropriate)  Goal: Skin Health and Integrity  Outcome: Ongoing (interventions implemented as appropriate)     Problem: Fall Risk (Adult)  Goal: Identify Related Risk Factors and Signs and Symptoms  Outcome: Ongoing (interventions implemented as appropriate)  Goal: Absence of Fall  Outcome: Ongoing (interventions implemented as appropriate)     Problem: Pneumonia (Adult)  Goal: Signs and Symptoms of Listed Potential Problems Will be Absent, Minimized or Managed (Pneumonia)  Outcome: Ongoing (interventions implemented as appropriate)     Problem: Breathing Pattern Ineffective (Adult)  Goal: Identify Related Risk Factors and Signs and Symptoms  Outcome: Ongoing (interventions implemented as appropriate)  Goal: Effective Oxygenation/Ventilation  Outcome: Ongoing (interventions implemented as appropriate)  Goal: Anxiety/Fear Reduction  Outcome: Ongoing (interventions implemented as appropriate)

## 2020-04-10 NOTE — PROGRESS NOTES
INFECTIOUS DISEASES PROGRESS NOTE    CC: f/u COVID    S:   Slept poorly  Breathing improved now down to 2.5 L  No f/c/ns.    O:  Physical Exam:  Temp:  [95.4 °F (35.2 °C)-97.1 °F (36.2 °C)] 97.1 °F (36.2 °C)  Heart Rate:  [68-96] 70  Resp:  [16-18] 18  BP: (148-152)/() 149/97  Physical Exam  No acute distress, anxious but overall appropriate mood and affect, speech clear and coherent.  Rest of exam deferred due to strict isolation precautions need to preserve PPE     Diagnostics:    Bcx NGTD  RPP neg  COVID 19 +  Resp cx: nl enrico      Assessment/Plan   1.  COVID-19 pneumonia  2.  Acute hypoxic respiratory failure     Continues to make progress.  Tried to reassure patient as he is anxious  We will cont plaquenil BID dosing and plan for two more days  Trying to wean oxygen but maybe candidate to d/c on home O2 if able.  D/w RN re impression and plan  Not much more to add right now.  We will be happy to reevaluate anytime you need.    Bryan Ma MD  9:36 AM  04/10/20

## 2020-04-10 NOTE — PROGRESS NOTES
Adult Nutrition  Assessment/PES    Patient Name:  Akin Chaudhry  YOB: 1935  MRN: [id number]  Admit Date:  4/6/2020    Assessment Date:  4/10/2020    Comments:  Nutrition f/u:  Breathing improving, down to 2.5 L.  On Heart Healthy diet.  Intake ~50-75% PO.  Continue to encourage adequate intake at all meals. No bowel movement recorded.     Recommend: Start bowel regimen.     RD to continue to follow.      Reason for Assessment     Row Name 04/10/20 1443          Reason for Assessment    Reason For Assessment  follow-up protocol     Diagnosis  pulmonary disease;infection/sepsis PNA. R/O Covid-19     Identified At Risk by Screening Criteria  MST SCORE 2+         Nutrition/Diet History     Row Name 04/10/20 1447          Nutrition/Diet History    Typical Food/Fluid Intake  Tolerating regular, cardiac diet.             Labs/Tests/Procedures/Meds     Row Name 04/10/20 1450          Labs/Procedures/Meds    Lab Results Reviewed  reviewed        Diagnostic Tests/Procedures    Diagnostic Test/Procedure Reviewed  reviewed        Medications    Pertinent Medications Reviewed  reviewed     Pertinent Medications Comments  Hydroxychoroquine         Physical Findings     Row Name 04/10/20 1452          Physical Findings    Overall Physical Appearance  on oxygen therapy     Skin  other (see comments) b-19           Nutrition Prescription Ordered     Row Name 04/10/20 1453          Nutrition Prescription PO    Current PO Diet  Regular     Fluid Consistency  Thin     Common Modifiers  Cardiac         Evaluation of Received Nutrient/Fluid Intake     Row Name 04/10/20 1453          PO Evaluation    Number of Meals  2     % PO Intake  50-75%               Problem/Interventions:          Intervention Goal     Row Name 04/10/20 1451          Intervention Goal    General  Maintain nutrition;Reduce/improve symptoms;Meet nutritional needs for age/condition     PO  Tolerate PO;PO intake (%)     PO Intake %  80 %     Weight   Maintain weight         Nutrition Intervention     Row Name 04/10/20 1455          Nutrition Intervention    RD/Tech Action  Encourage intake;Follow Tx progress;Care plan reviewd           Education/Evaluation     Row Name 04/10/20 1456          Education    Education  Will Instruct as appropriate        Monitor/Evaluation    Monitor  Per protocol;I&O;PO intake;Pertinent labs;Weight;Skin status;Symptoms           Electronically signed by:  Angeles Vázquez RD  04/10/20 14:58

## 2020-04-10 NOTE — PROGRESS NOTES
"  PROGRESS NOTE  Patient Name: Akin Chaudhry  Age/Sex: 84 y.o. male  : 1935  MRN: [id number]    Date of Admission: 2020  Date of Encounter Visit: 04/10/20   LOS: 4 days   Patient Care Team:  Neda Parker MD as PCP - General (Internal Medicine)    Chief Complaint: Admitted with acute hypoxemic respiratory failure, suspected COVID-19 infection    Hospital course: Patient admitted through the ER to the ICU, was started on antibiotic for possible superimposed bacterial infection while waiting on the results of the RT-PCR for the viral COVID-19.Patient had mild lactic acidosis that has corrected with supportive measures, white count is normal, kidney function is looking good with mild hypokalemia, on 8 L nasal cannula oxygen when he initially transferred to the ICU however his oxygen requirement did improve    Interval History: No more fever, he is on 3 L nasal cannula oxygen, was on 2 L yesterday    REVIEW OF SYSTEMS:   CONSTITUTIONAL: no fever or chills  CARDIOVASCULAR: No chest pain, chest pressure or chest discomfort. No palpitations or edema.   RESPIRATORY: Improving shortness of breath, nonproductive cough, improving.   GASTROINTESTINAL: No anorexia, nausea, vomiting or diarrhea. No abdominal pain or blood.   HEMATOLOGIC: No bleeding or bruising.     Ventilator/Non-Invasive Ventilation Settings (From admission, onward)    2 L/min nasal cannula oxygen            Vital Signs  Temp:  [95.4 °F (35.2 °C)-97.1 °F (36.2 °C)] 97.1 °F (36.2 °C)  Heart Rate:  [68-96] 70  Resp:  [16-18] 18  BP: (148-152)/() 149/97  SpO2:  [87 %-92 %] 91 %  on  Flow (L/min):  [2-2.5] 2.5 Device (Oxygen Therapy): nasal cannula    Intake/Output Summary (Last 24 hours) at 4/10/2020 1539  Last data filed at 4/10/2020 1404  Gross per 24 hour   Intake 200 ml   Output 1350 ml   Net -1150 ml     Flowsheet Rows      First Filed Value   Admission Height  177.8 cm (70\") Documented at 2020 0328   Admission Weight  80.1 kg (176 " lb 9.6 oz) Documented at 04/06/2020 0328        Body mass index is 23.79 kg/m².      04/06/20  0525 04/07/20  0700 04/08/20  0700   Weight: 73.8 kg (162 lb 11.2 oz) 73.5 kg (162 lb 0.6 oz) 75.2 kg (165 lb 12.6 oz)       Physical Exam:  GEN:  No acute distress, alert, cooperative, well developed   EYES:   Sclerae clear. No icterus. PERRL. Normal EOM  ENT:   External ears/nose normal, no oral lesions, no thrush, mucous membranes moist  NECK:  Supple, midline trachea, no JVD  LUNGS: Normal chest on inspection, patient has positive crackles bilaterally, right more than left, no expiratory wheezes but minimal rhonchi.  Patient with breathing regularly with nonlabored pattern but while talking he got slightly short of breath  CV: Irregularly irregular rhythm with controlled heart rate. Normal S1/S2. No murmurs, gallops, or rubs noted.  ABD:  Soft, nontender and nondistended. Normal bowel sounds. No guarding  EXT:  Moves all extremities well. No cyanosis. No redness. No edema.   Skin: Dry, intact, no bleeding    Results Review:      Results from last 7 days   Lab Units 04/09/20  0430 04/08/20  0328 04/07/20  0542 04/06/20  0335   SODIUM mmol/L 137 140 140 139   POTASSIUM mmol/L 4.4 4.8 3.3* 3.9   CHLORIDE mmol/L 103 109* 107 101   CO2 mmol/L 23.1 22.3 20.9* 22.7   BUN mg/dL 17 20 17 23   CREATININE mg/dL 0.86 0.87 0.68* 1.09   CALCIUM mg/dL 8.3* 8.0* 6.9* 8.9   AST (SGOT) U/L 20 22 16 26   ALT (SGPT) U/L 20 20 16 27   ANION GAP mmol/L 10.9 8.7 12.1 15.3*   ALBUMIN g/dL 2.80* 2.70* 2.50* 3.60     Results from last 7 days   Lab Units 04/06/20  0335   TROPONIN T ng/mL <0.010         Results from last 7 days   Lab Units 04/06/20  0335   PROBNP pg/mL 850.9     Results from last 7 days   Lab Units 04/09/20  0430 04/08/20  0328 04/07/20  0542 04/06/20  0335   WBC 10*3/mm3 6.79 7.16 6.70 7.37   HEMOGLOBIN g/dL 14.7 15.3 14.8 16.4   HEMATOCRIT % 44.0 46.1 44.5 50.0   PLATELETS 10*3/mm3 322 292 280 264   MCV fL 83.0 85.7 83.8 84.2    NEUTROPHIL % % 67.8 71.9  --  72.9   LYMPHOCYTE % % 18.1* 14.8*  --  16.4*   MONOCYTES % % 11.0 10.8  --  9.0   EOSINOPHIL % % 2.1 1.5  --  0.9   BASOPHIL % % 0.4 0.4  --  0.3   IMM GRAN % % 0.6* 0.6*  --  0.5         Results from last 7 days   Lab Units 04/08/20  0328 04/07/20  0542   MAGNESIUM mg/dL 2.4 1.8           Invalid input(s): LDLCALC  Results from last 7 days   Lab Units 04/06/20  0350   PH, ARTERIAL pH units 7.514*   PCO2, ARTERIAL mm Hg 24.9*   PO2 ART mm Hg 60.2*   HCO3 ART mmol/L 20.0*         No results found for: POCGLU  Results from last 7 days   Lab Units 04/07/20  0542 04/06/20  0858 04/06/20  0335   PROCALCITONIN ng/mL 0.08*  --  0.11   LACTATE mmol/L  --  1.6 2.4*     Results from last 7 days   Lab Units 04/07/20  0805 04/06/20  0346 04/06/20  0336   BLOODCX   --  No growth at 4 days No growth at 4 days   RESPCX  Light growth (2+) Normal Respiratory Ariana  --   --          Results from last 7 days   Lab Units 04/06/20  0339   ADENOVIRUS DETECTION BY PCR  Not Detected   CORONAVIRUS 229E  Not Detected   CORONAVIRUS HKU1  Not Detected   CORONAVIRUS NL63  Not Detected   CORONAVIRUS OC43  Not Detected   HUMAN METAPNEUMOVIRUS  Not Detected   HUMAN RHINOVIRUS/ENTEROVIRUS  Not Detected   INFLUENZA B PCR  Not Detected   PARAINFLUENZA 1  Not Detected   PARAINFLUENZA VIRUS 2  Not Detected   PARAINFLUENZA VIRUS 3  Not Detected   PARAINFLUENZA VIRUS 4  Not Detected   BORDETELLA PERTUSSIS PCR  Not Detected   BORDETELLA PARAPERTUSSIS PCR  Not Detected   [id number]  Not Detected   CHLAMYDOPHILA PNEUMONIAE PCR  Not Detected   MYCOPLAMA PNEUMO PCR  Not Detected   INFLUENZA A H3  Not Detected   INFLUENZA A H1  Not Detected   RSV, PCR  Not Detected               Imaging:   Imaging Results (All)     Procedure Component Value Units Date/Time    XR Chest AP [[id number]] Collected:  04/06/20 0404     Updated:  04/06/20 0527    Narrative:       PORTABLE CHEST X-RAY     CLINICAL HISTORY: COVID Evaluation, Cough, Fever;  J18.9-Pneumonia,  unspecified organism; R68.89-Other general symptoms and signs;  J96.01-Acute respiratory failure with hypoxia     COMPARISON: 01/23/2019.     FINDINGS: Portable AP view of the chest was obtained with overlying  monitor leads in place. Lungs are well inflated. There is underlying  emphysema and fibrosis. Patchy groundglass opacities have developed  bilaterally, more pronounced in the lung bases, right greater than left  and peripheral perihilar regions and consistent with multifocal  pneumonia. Borderline cardiomegaly. No edema or pleural fluid. Stable  mild aortic ectasia.             Impression:       Emphysema with development of bilateral groundglass  opacities likely multifocal pneumonia        This report was finalized on 4/6/2020 5:24 AM by Juma Panda M.D.                      Medication Review:     apixaban 5 mg Oral Q12H   atorvastatin 40 mg Oral Daily   fluticasone 1 spray Each Nare BID   hydroxychloroquine 200 mg Oral Q12H   metoprolol succinate XL 50 mg Oral Daily   tamsulosin 0.4 mg Oral Nightly            ASSESSMENT:   1. Right lower lobe pneumonia, suspected COVID-19   2. Acute hypoxemic respiratory failure  3. Atrial fibrillation on chronic anticoagulation  4. Mild lactic acidosis, resolved  5. Hypokalemia  6. Hypocalcemia  7. Hypoalbuminemia    PLAN:  Started hydroxychloroquine 600 mg every 12 on 4/7/2020 with a plan to go to 200 every 8 hours afterwards,  · Even though is improving however I am not very comfortable at this point given his overall status and underlying coexisting condition.  Would like to monitor for 24 more hours, oxygen can be arranged tomorrow the day of discharge  Is on hydroxychloroquine per infectious disease  Discussed with the patient in detail      Disposition: Home in a.m. if he continues to improve with home oxygen    Gilda Wolf MD  04/10/20  15:39          Dictated utilizing Dragon dictation

## 2020-04-10 NOTE — PROGRESS NOTES
Continued Stay Note  [facility]     Patient Name: Akin Chaudhry  MRN: [id number]  Today's Date: 4/10/2020    Admit Date: 4/6/2020    Discharge Plan     Row Name 04/10/20 1401       Plan    Plan  Home with his spouse, [facility] for home oxygen if needed    Plan Comments  Spoke with patient and he plans home with family.  States his spouse could transport at [place].  He prefers [facility] for home oxygen and [facility] is following.  He will not have a problem obtaining food or other needs being in quarantine at home.  Denies any other needs at this time...........................Ayse Jacques RN        Discharge Codes    No documentation.             Ayse Jacques RN

## 2020-04-11 LAB
BACTERIA SPEC AEROBE CULT: NORMAL
BACTERIA SPEC AEROBE CULT: NORMAL

## 2020-04-11 RX ADMIN — HYDROXYCHLOROQUINE SULFATE 200 MG: 200 TABLET ORAL at 09:57

## 2020-04-11 RX ADMIN — APIXABAN 5 MG: 5 TABLET, FILM COATED ORAL at 09:41

## 2020-04-11 RX ADMIN — TAMSULOSIN HYDROCHLORIDE 0.4 MG: 0.4 CAPSULE ORAL at 22:05

## 2020-04-11 RX ADMIN — APIXABAN 5 MG: 5 TABLET, FILM COATED ORAL at 22:05

## 2020-04-11 RX ADMIN — FLUTICASONE PROPIONATE 1 SPRAY: 50 SPRAY, METERED NASAL at 22:06

## 2020-04-11 RX ADMIN — METOPROLOL SUCCINATE 50 MG: 50 TABLET, EXTENDED RELEASE ORAL at 09:41

## 2020-04-11 RX ADMIN — ATORVASTATIN CALCIUM 40 MG: 20 TABLET, FILM COATED ORAL at 09:57

## 2020-04-11 RX ADMIN — FLUTICASONE PROPIONATE 1 SPRAY: 50 SPRAY, METERED NASAL at 09:43

## 2020-04-11 NOTE — PROGRESS NOTES
Dr. KAM Rich    [address]    4/11/2020    Patient ID:  Name:  Akin Chaudhry  MRN:  [id number]  1935  84 y.o.  male            CC/Reason for visit: COVID-19 pneumonia    Interval hx: Patient is still having some cough and shortness of breath but feels better than yesterday.    ROS: No fever.  No sputum.  No abdominal pain, no palpitations    Vitals:  Vitals:    04/10/20 0820 04/10/20 2059 04/11/20 0048 04/11/20 0941   BP: 149/97 147/98 135/82 119/89   BP Location: Right arm Right arm Left arm Right arm   Patient Position:  Lying Lying Sitting   Pulse: 70  71 79   Resp: 18 18 18 20   Temp: 97.1 °F (36.2 °C) 97.6 °F (36.4 °C) 97.6 °F (36.4 °C) 97.7 °F (36.5 °C)   TempSrc:  Oral Oral Oral   SpO2:  91% 93% 93%   Weight:       Height:               Body mass index is 23.79 kg/m².    Intake/Output Summary (Last 24 hours) at 4/11/2020 1405  Last data filed at 4/11/2020 0958  Gross per 24 hour   Intake 480 ml   Output 750 ml   Net -270 ml       Exam:  GEN:  No distress  Alert, oriented x 3.   LUNGS:  some diminished and coarse breath sounds bilat, no use of accessory muscles  CV:  Normal S1S2, without murmur, no edema  ABD:  Non tender, no enlarged liver or masses      Scheduled meds:    apixaban 5 mg Oral Q12H   atorvastatin 40 mg Oral Daily   fluticasone 1 spray Each Nare BID   hydroxychloroquine 200 mg Oral Q12H   metoprolol succinate XL 50 mg Oral Daily   tamsulosin 0.4 mg Oral Nightly     IV meds:                           Data Review:   I reviewed the patient's medications and new clinical results.    COVID19   Date Value Ref Range Status   04/06/2020 Detected (C) Not Detected Final         Lab Results   Component Value Date    CALCIUM 8.3 (L) 04/09/2020    MG 2.4 04/08/2020    MG 1.8 04/07/2020    MG 1.9 10/18/2016     Results from last 7 days   Lab Units 04/09/20  0430 04/08/20  0328 04/07/20  0542 04/06/20  0335   SODIUM mmol/L 137 140 140 139   POTASSIUM mmol/L 4.4 4.8 3.3* 3.9    CHLORIDE mmol/L 103 109* 107 101   CO2 mmol/L 23.1 22.3 20.9* 22.7   BUN mg/dL 17 20 17 23   CREATININE mg/dL 0.86 0.87 0.68* 1.09   CALCIUM mg/dL 8.3* 8.0* 6.9* 8.9   BILIRUBIN mg/dL 1.2 1.3* 1.1 1.6*   ALK PHOS U/L 64 64 54 77   ALT (SGPT) U/L 20 20 16 27   AST (SGOT) U/L 20 22 16 26   GLUCOSE mg/dL 88 110* 82 128*   WBC 10*3/mm3 6.79 7.16 6.70 7.37   HEMOGLOBIN g/dL 14.7 15.3 14.8 16.4   PLATELETS 10*3/mm3 322 292 280 264   PROBNP pg/mL  --   --   --  850.9   PROCALCITONIN ng/mL  --   --  0.08* 0.11     Results from last 7 days   Lab Units 04/07/20  0805 04/06/20  0346 04/06/20  0336   BLOODCX   --  No growth at 5 days No growth at 5 days   RESPCX  Light growth (2+) Normal Respiratory Ariana  --   --          Results from last 7 days   Lab Units 04/06/20  0335   TROPONIN T ng/mL <0.010     Results from last 7 days   Lab Units 04/06/20  0350   PH, ARTERIAL pH units 7.514*   PCO2, ARTERIAL mm Hg 24.9*   PO2 ART mm Hg 60.2*   FLOW RATE lpm 6   MODALITY  Cannula   O2 SATURATION CALC % 93.6       Estimated Creatinine Clearance: 68 mL/min (by C-G formula based on SCr of 0.86 mg/dL).      ASSESSMENT:   1. Pneumonia due to COVID-19   2. Acute hypoxemic respiratory failure  3. Atrial fibrillation on chronic anticoagulation  4. Mild lactic acidosis, resolved  5. Hypokalemia  6. Hypocalcemia  7. Hypoalbuminemia      PLAN:  Patient is new to me during this admission.  The patient's condition is slowly improving.  COVID-19 infection has been confirmed.  Continue supportive care with oxygen and expectorants.  Use bronchodilators only if he develops wheezing.  Continue weaning oxygen as tolerated.  Patient needs to ambulate and move in order to prevent atelectasis.  Hydroxychloroquine may be discontinued.  Patient has improved clinically and this treatment is experimental.  This patient has several chronic medical conditions, and now several acute medical illnesses.  Extensive amount of data was reviewed.  Images were directly  visualized by me.  This patient warrants high complexity medical decision-making.          Lyle Rich MD  4/11/2020

## 2020-04-11 NOTE — PLAN OF CARE
Problem: Patient Care Overview  Goal: Plan of Care Review  Outcome: Ongoing (interventions implemented as appropriate)  Flowsheets (Taken 4/11/2020 9861)  Progress: improving  Plan of Care Reviewed With: patient  Outcome Summary: started at 3L of oxygen at the beginning of shift, weaned down to 2.5 and has maintained around 93% throughout the night. no reports of SOB or pain. pt is using the urinal on his own. educated patient about fall risks and to report if pt experiences any SOB. will continue to monitor

## 2020-04-11 NOTE — PLAN OF CARE
Problem: Patient Care Overview  Goal: Plan of Care Review  Outcome: Ongoing (interventions implemented as appropriate)  Flowsheets  Taken 4/11/2020 1905 by Mile Benjamin, RN  Progress: improving  Outcome Summary: Pt denies SOA today, O2 down to 1 L, sats staying 92-95%. Afebrile, VSS. Encouraged more activity and use of IS at bedside to improve aeration. Pt concerned about going home with oxygen. no other c/o. will closely monitor.  Taken 4/11/2020 0357 by Jovita Fernandez, RN  Plan of Care Reviewed With: patient

## 2020-04-12 RX ADMIN — TAMSULOSIN HYDROCHLORIDE 0.4 MG: 0.4 CAPSULE ORAL at 20:04

## 2020-04-12 RX ADMIN — FLUTICASONE PROPIONATE 1 SPRAY: 50 SPRAY, METERED NASAL at 09:34

## 2020-04-12 RX ADMIN — ATORVASTATIN CALCIUM 40 MG: 20 TABLET, FILM COATED ORAL at 09:34

## 2020-04-12 RX ADMIN — APIXABAN 5 MG: 5 TABLET, FILM COATED ORAL at 09:34

## 2020-04-12 RX ADMIN — METOPROLOL SUCCINATE 50 MG: 50 TABLET, EXTENDED RELEASE ORAL at 09:34

## 2020-04-12 RX ADMIN — APIXABAN 5 MG: 5 TABLET, FILM COATED ORAL at 20:04

## 2020-04-12 RX ADMIN — FLUTICASONE PROPIONATE 1 SPRAY: 50 SPRAY, METERED NASAL at 20:04

## 2020-04-13 RX ADMIN — APIXABAN 5 MG: 5 TABLET, FILM COATED ORAL at 08:24

## 2020-04-13 RX ADMIN — TAMSULOSIN HYDROCHLORIDE 0.4 MG: 0.4 CAPSULE ORAL at 20:23

## 2020-04-13 RX ADMIN — APIXABAN 5 MG: 5 TABLET, FILM COATED ORAL at 20:23

## 2020-04-13 RX ADMIN — FLUTICASONE PROPIONATE 1 SPRAY: 50 SPRAY, METERED NASAL at 08:24

## 2020-04-13 RX ADMIN — FLUTICASONE PROPIONATE 1 SPRAY: 50 SPRAY, METERED NASAL at 20:23

## 2020-04-13 RX ADMIN — METOPROLOL SUCCINATE 50 MG: 50 TABLET, EXTENDED RELEASE ORAL at 08:24

## 2020-04-13 RX ADMIN — ATORVASTATIN CALCIUM 40 MG: 20 TABLET, FILM COATED ORAL at 08:24

## 2020-04-13 NOTE — PLAN OF CARE
Patient doing very well however he did require 1.5L supplemental oxygen to be placed while sleeping. Patient was upset thinking this would keep him from being discharged today. Will continue to monitor.       Problem: Patient Care Overview  Goal: Plan of Care Review  Outcome: Ongoing (interventions implemented as appropriate)  Flowsheets  Taken 4/13/2020 0638 by Mirela Frances, RN  Progress: no change  Taken 4/12/2020 0528 by Jovita Fernandez RN  Plan of Care Reviewed With: patient  Goal: Individualization and Mutuality  Outcome: Ongoing (interventions implemented as appropriate)  Goal: Discharge Needs Assessment  Outcome: Ongoing (interventions implemented as appropriate)  Goal: Interprofessional Rounds/Family Conf  Outcome: Ongoing (interventions implemented as appropriate)     Problem: Skin Injury Risk (Adult)  Goal: Identify Related Risk Factors and Signs and Symptoms  Outcome: Ongoing (interventions implemented as appropriate)  Goal: Skin Health and Integrity  Outcome: Ongoing (interventions implemented as appropriate)     Problem: Fall Risk (Adult)  Goal: Identify Related Risk Factors and Signs and Symptoms  Outcome: Ongoing (interventions implemented as appropriate)  Goal: Absence of Fall  Outcome: Ongoing (interventions implemented as appropriate)

## 2020-04-13 NOTE — PLAN OF CARE
Problem: Patient Care Overview  Goal: Plan of Care Review  Outcome: Ongoing (interventions implemented as appropriate)  Flowsheets  Taken 4/13/2020 1840  Progress: improving  Outcome Summary: Pt is currently on room air and stating in the low 90's, he has been up out of bed to use the restroom, he did have a BM this afternoon. Pt is due to go home on tomorrow per Dr. Rich  Taken 4/13/2020 2820  Plan of Care Reviewed With: patient

## 2020-04-13 NOTE — PROGRESS NOTES
Dr. KAM Rich    [address]    4/13/2020    Patient ID:  Name:  Akin Chaudhry  MRN:  [id number]  1935  84 y.o.  male            CC/Reason for visit: COVID-19 pneumonia     Interval hx: Patient  needed 1 L of oxygen again overnight.  He is still having some cough but feels better today.     ROS: No fever.  No sputum.  No abdominal pain, no palpitations    Vitals:  Vitals:    04/13/20 0450 04/13/20 0600 04/13/20 0741 04/13/20 1445   BP: 139/90  137/85 134/86   BP Location: Right arm  Left arm Left arm   Patient Position: Lying  Lying Lying   Pulse:  62 67 81   Resp: 14  16 16   Temp: 96.7 °F (35.9 °C)  97.3 °F (36.3 °C) 96.9 °F (36.1 °C)   TempSrc: Oral  Oral Oral   SpO2:  93%     Weight:       Height:               Body mass index is 23.79 kg/m².    Intake/Output Summary (Last 24 hours) at 4/13/2020 1821  Last data filed at 4/12/2020 2329  Gross per 24 hour   Intake --   Output 325 ml   Net -325 ml       Exam:  GEN:  No distress  Alert, oriented x 3.   LUNGS: Clear breath sounds bilat, no use of accessory muscles  CV:  Normal S1S2, without murmur, no edema  ABD:  Non tender, no enlarged liver or masses      Scheduled meds:    apixaban 5 mg Oral Q12H   atorvastatin 40 mg Oral Daily   fluticasone 1 spray Each Nare BID   metoprolol succinate XL 50 mg Oral Daily   tamsulosin 0.4 mg Oral Nightly     IV meds:                           Data Review:   I reviewed the patient's medications and new clinical results.    COVID19   Date Value Ref Range Status   04/06/2020 Detected (C) Not Detected Final         Lab Results   Component Value Date    CALCIUM 8.3 (L) 04/09/2020    MG 2.4 04/08/2020    MG 1.8 04/07/2020    MG 1.9 10/18/2016     Results from last 7 days   Lab Units 04/09/20  0430 04/08/20  0328 04/07/20  0542   SODIUM mmol/L 137 140 140   POTASSIUM mmol/L 4.4 4.8 3.3*   CHLORIDE mmol/L 103 109* 107   CO2 mmol/L 23.1 22.3 20.9*   BUN mg/dL 17 20 17   CREATININE mg/dL 0.86 0.87 0.68*    CALCIUM mg/dL 8.3* 8.0* 6.9*   BILIRUBIN mg/dL 1.2 1.3* 1.1   ALK PHOS U/L 64 64 54   ALT (SGPT) U/L 20 20 16   AST (SGOT) U/L 20 22 16   GLUCOSE mg/dL 88 110* 82   WBC 10*3/mm3 6.79 7.16 6.70   HEMOGLOBIN g/dL 14.7 15.3 14.8   PLATELETS 10*3/mm3 322 292 280   PROCALCITONIN ng/mL  --   --  0.08*     Results from last 7 days   Lab Units 04/07/20  0805   RESPCX  Light growth (2+) Normal Respiratory Ariana         ASSESSMENT:   1. Pneumonia due to COVID-19   2. Acute hypoxemic respiratory failure  3. Atrial fibrillation on chronic anticoagulation  4. Mild lactic acidosis, resolved  5. Hypokalemia  6. Hypocalcemia  7. Hypoalbuminemia    PLAN:  Required some supplemental oxygen again overnight.  We will do ambulatory oximetry testing tomorrow.  Anticipate discharging home with oxygen tomorrow and instructions for strict self quarantine and self-isolation at home, following CDC guidelines, and protecting his family from him as a potential source of transmission of COVID-19.        Lyle Rich MD  4/13/2020

## 2020-04-14 ENCOUNTER — READMISSION MANAGEMENT (OUTPATIENT)
Dept: CALL CENTER | Facility: HOSPITAL | Age: 85
End: 2020-04-14

## 2020-04-14 VITALS
HEART RATE: 81 BPM | TEMPERATURE: 96.8 F | RESPIRATION RATE: 16 BRPM | HEIGHT: 70 IN | WEIGHT: 165.79 LBS | OXYGEN SATURATION: 93 % | DIASTOLIC BLOOD PRESSURE: 78 MMHG | SYSTOLIC BLOOD PRESSURE: 102 MMHG | BODY MASS INDEX: 23.73 KG/M2

## 2020-04-14 PROBLEM — J12.82 PNEUMONIA DUE TO COVID-19 VIRUS: Status: ACTIVE | Noted: 2020-04-14

## 2020-04-14 PROBLEM — U07.1 PNEUMONIA DUE TO COVID-19 VIRUS: Status: ACTIVE | Noted: 2020-04-14

## 2020-04-14 RX ADMIN — METOPROLOL SUCCINATE 50 MG: 50 TABLET, EXTENDED RELEASE ORAL at 08:08

## 2020-04-14 RX ADMIN — FLUTICASONE PROPIONATE 1 SPRAY: 50 SPRAY, METERED NASAL at 08:08

## 2020-04-14 RX ADMIN — ATORVASTATIN CALCIUM 40 MG: 20 TABLET, FILM COATED ORAL at 08:08

## 2020-04-14 RX ADMIN — APIXABAN 5 MG: 5 TABLET, FILM COATED ORAL at 08:08

## 2020-04-14 NOTE — PROGRESS NOTES
Continued Stay Note  [facility]     Patient Name: Akin Chaudhry  MRN: [id number]  Today's Date: 4/14/2020    Admit Date: 4/6/2020    Discharge Plan     Row Name 04/14/20 1205       Plan    Plan  Home with his spouse and Prefers [facility] if home oxygen is needed, family will transport    Plan Comments  Spoke with patient via phone.  He states he prefers [facility] pharmacy for his DC medications and does not want to use our outpatient pharmacy.  He states family will transport and that they have fixed a room for him to be in self quarantine.  He denies an issue obtaining food or other needs.  His spouse or dtr will transport.  He prefers [facility] for oxygen if needed and awaiting walking pulse oximety testing and orders........................Ayse Jacques RN        Discharge Codes    No documentation.             Ayse Jacques RN

## 2020-04-14 NOTE — DISCHARGE SUMMARY
Patient Identification:  Name: Akin Chaudhry  Age: 84 y.o.  Sex: male  :  1935  MRN: [id number]  Primary Care Physician: Neda Parker MD    Admit date: 2020  Discharge date and time: No discharge date for patient encounter.   Discharged Condition: good    Discharge Diagnoses:    Pneumonia due to COVID-19 virus    Permanent atrial fibrillation    Acute respiratory failure with hypoxia (CMS/HCC)  Hypokalemia  Hypocalcemia         Hospital Course: Akin Chaudhry presented to [facility] shortness of breath, bilateral infiltrates on chest x-ray and respiratory failure.  He was diagnosed with COVID-19 pneumonia.  He received supportive care.  He slowly improved over the following days.  He has now been weaned off of oxygen completely.  He is on Eliquis now for his atrial fibrillation and other cardiac medications as listed below.  He needs to follow-up with his primary care physician in 1 month.  He needs to self isolate and remain in quarantine for an additional 14 days at home and follow CDC guidelines for protecting himself and his family.      Consults:   IP CONSULT TO PULMONOLOGY  IP CONSULT TO INFECTIOUS DISEASES    Significant Diagnostic Studies:   Imaging Results (Most Recent)     Procedure Component Value Units Date/Time    XR Chest AP [[id number]] Collected:  20 0404     Updated:  20 0527    Narrative:       PORTABLE CHEST X-RAY     CLINICAL HISTORY: COVID Evaluation, Cough, Fever; J18.9-Pneumonia,  unspecified organism; R68.89-Other general symptoms and signs;  J96.01-Acute respiratory failure with hypoxia     COMPARISON: 2019.     FINDINGS: Portable AP view of the chest was obtained with overlying  monitor leads in place. Lungs are well inflated. There is underlying  emphysema and fibrosis. Patchy groundglass opacities have developed  bilaterally, more pronounced in the lung bases, right greater than left  and peripheral perihilar regions and  consistent with multifocal  pneumonia. Borderline cardiomegaly. No edema or pleural fluid. Stable  mild aortic ectasia.             Impression:       Emphysema with development of bilateral groundglass  opacities likely multifocal pneumonia        This report was finalized on 4/6/2020 5:24 AM by Juma Panda M.D.                   TEST  RESULTS PENDING AT DISCHARGE      Discharge Exam:  Alert and oriented x 4, in NAD  Supple neck, midline trach  RRR, no m/r/g, no edema  Clear bilaterally, no wheezing, nonlabored  No clubbing or cyanosis     Disposition:  Home    Patient Instructions:      Discharge Medications      Continue These Medications      Instructions Start Date   apixaban 5 MG tablet tablet  Commonly known as:  ELIQUIS   5 mg, Oral, 2 Times Daily      atorvastatin 40 MG tablet  Commonly known as:  LIPITOR   40 mg, Oral, Daily      finasteride 1 MG tablet  Commonly known as:  PROPECIA   TAKE ONE TABLET BY MOUTH ONCE DAILY      metoprolol succinate XL 25 MG 24 hr tablet  Commonly known as:  TOPROL-XL   25 mg, Oral, Daily      potassium chloride 20 MEQ CR tablet  Commonly known as:  K-DUR,KLOR-CON   TAKE ONE TABLET BY MOUTH ONCE DAILY      tamsulosin 0.4 MG capsule 24 hr capsule  Commonly known as:  FLOMAX   1 capsule, Oral, Nightly         Stop These Medications    aspirin 81 MG tablet     furosemide 20 MG tablet  Commonly known as:  Lasix     hydroCHLOROthiazide 12.5 MG tablet  Commonly known as:  HYDRODIURIL     losartan 100 MG tablet  Commonly known as:  COZAAR             Your medication list      CONTINUE taking these medications      Instructions Last Dose Given Next Dose Due   apixaban 5 MG tablet tablet  Commonly known as:  ELIQUIS      Take 5 mg by mouth 2 (Two) Times a Day.       atorvastatin 40 MG tablet  Commonly known as:  LIPITOR      Take 1 tablet by mouth Daily.       finasteride 1 MG tablet  Commonly known as:  PROPECIA      TAKE ONE TABLET BY MOUTH ONCE DAILY       metoprolol succinate XL  25 MG 24 hr tablet  Commonly known as:  TOPROL-XL      Take 25 mg by mouth Daily.       potassium chloride 20 MEQ CR tablet  Commonly known as:  K-DUR,KLOR-CON      TAKE ONE TABLET BY MOUTH ONCE DAILY       tamsulosin 0.4 MG capsule 24 hr capsule  Commonly known as:  FLOMAX      Take 1 capsule by mouth Every Night.          STOP taking these medications    aspirin 81 MG tablet        furosemide 20 MG tablet  Commonly known as:  Lasix        hydroCHLOROthiazide 12.5 MG tablet  Commonly known as:  HYDRODIURIL        losartan 100 MG tablet  Commonly known as:  COZAAR                   Medication Reconciliation: Please see electronically completed Med Rec.    Total time spent discharging patient including evaluation, medication reconciliation, arranging follow up, and post hospitalization instructions and education to patient and family total time exceeds 30 minutes.    Signed:  Lyle Rich MD  4/14/2020  15:00

## 2020-04-14 NOTE — OUTREACH NOTE
Prep Survey      Responses   [facility] facility patient discharged from?  [place]   Is LACE score < 7 ?  No   Eligibility  Readm Mgmt   Discharge diagnosis  Pneumonia due to COVID-19 virus Afib   COVID-19 Test Status  Confirmed   Does the patient have one of the following disease processes/diagnoses(primary or secondary)?  COPD/Pneumonia   Does the patient have Home health ordered?  No   Is there a DME ordered?  Yes   What DME was ordered?  O2 from [facility] if needed   General alerts for this patient  Quarantine x 14 days   Prep survey completed?  Yes          Eloina Varela RN

## 2020-04-14 NOTE — NURSING NOTE
Placed patient on 1L oxygen due to sats staying at 88-89%. Will try to resume room air in the morning

## 2020-04-14 NOTE — PLAN OF CARE
Problem: Patient Care Overview  Goal: Plan of Care Review  Outcome: Ongoing (interventions implemented as appropriate)  Flowsheets (Taken 4/14/2020 2185)  Progress: improving  Plan of Care Reviewed With: patient  Outcome Summary: pt maintained well on room air until he fell asleep. pt was put back on 1L throughout the night and will take him off when he is more awake. pt got up and walked around the room a little bit. possible d/c later today. will continue to monitor

## 2020-04-15 ENCOUNTER — READMISSION MANAGEMENT (OUTPATIENT)
Dept: CALL CENTER | Facility: HOSPITAL | Age: 85
End: 2020-04-15

## 2020-04-15 NOTE — PROGRESS NOTES
Case Management Discharge Note      Final Note: home          Destination      No service has been selected for the patient.      Durable Medical Equipment      Service Provider Request Status Selected Services Address Phone Number Fax Number    [facility] [address], [place] [address] [phone]      Dialysis/Infusion      No service has been selected for the patient.      Home Medical Care      No service has been selected for the patient.      Therapy      No service has been selected for the patient.      Community Resources      No service has been selected for the patient.        Transportation Services  Private: Car    Final Discharge Disposition Code: 01 - home or self-care

## 2020-04-15 NOTE — OUTREACH NOTE
COPD/PN Week 1 Survey      Responses   [facility] patient discharged from?  [place]   COVID-19 Test Status  Confirmed   Does the patient have one of the following disease processes/diagnoses(primary or secondary)?  COPD/Pneumonia   Is there a successful TCM telephone encounter documented?  No   Was the primary reason for admission:  Pneumonia   Week 1 attempt successful?  No   Unsuccessful attempts  Attempt 1          Samantha Ware RN

## 2020-04-16 ENCOUNTER — READMISSION MANAGEMENT (OUTPATIENT)
Dept: CALL CENTER | Facility: HOSPITAL | Age: 85
End: 2020-04-16

## 2020-04-16 NOTE — OUTREACH NOTE
"COPD/PN Week 1 Survey      Responses   [facility]   COVID-19 Test Status  Confirmed   Does the patient have one of the following disease processes/diagnoses(primary or secondary)?  COPD/Pneumonia   Is there a successful TCM telephone encounter documented?  No   Was the primary reason for admission:  Pneumonia   Week 1 attempt successful?  Yes   Call start time  1322   Call end time  1326   Meds reviewed with patient/caregiver?  Yes   Does the patient have all medications ordered at discharge?  N/A   Does the patient have a primary care provider?   Yes   Does the patient have an appointment with their PCP or pulmonologist within 7 days of discharge?  No   Comments regarding PCP  PATIENT AND WIFE STATE THEY ARE LOOKING FOR A NEW PCP FOR THIS PATIENT DUE TO LITTLE COMMUNICATION FROM HIS CURRENT PCP.    What is preventing the patient from scheduling follow up appointments within 7 days of discharge?  Unsure of when or with whom   Nursing Interventions  Educated patient on importance of making appointment, Advised patient to make appointment [WIFE STATES SHE IS GOING TO FIND HIM A NEW PCP]   Has the patient kept scheduled appointments due by today?  N/A   Has home health visited the patient within 72 hours of discharge?  N/A   Did the patient receive a copy of their discharge instructions?  Yes   Nursing interventions  Reviewed instructions with patient   What is the patient's perception of their health status since discharge?  Improving   Is the patient/caregiver able to teach back the hierarchy of who to call/visit for symptoms/problems? PCP, Specialist, Home health nurse, Urgent Care, ED, 911  Yes   Additional teach back comments  PATIENT STATES HE IS VERY TIRED AND GETS SOA AT TIMES, BUT HIS \"AIR COMES BACK.\" WARNING SIGNS FOR CALLING HIS PHYSICIAN, AND GOING TO ED GIVEN.    Is the patient/caregiver able to teach back signs and symptoms of worsening condition:  Fever/chills, " Shortness of breath, Chest pain   Week 1 call completed?  Yes          Lindsay Santacruz LPN

## 2020-04-17 ENCOUNTER — READMISSION MANAGEMENT (OUTPATIENT)
Dept: CALL CENTER | Facility: HOSPITAL | Age: 85
End: 2020-04-17

## 2020-04-17 NOTE — OUTREACH NOTE
COPD/PN Week 1 Survey      Responses   [facility]   COVID-19 Test Status  Confirmed   Does the patient have one of the following disease processes/diagnoses(primary or secondary)?  COPD/Pneumonia   Is there a successful TCM telephone encounter documented?  No   Was the primary reason for admission:  Pneumonia   Week 1 attempt successful?  Yes   Call start time  1345   Call end time  1351   Discharge diagnosis  Pneumonia due to COVID-19 virus Afib   Meds reviewed with patient/caregiver?  Yes   Does the patient have all medications ordered at discharge?  N/A   Is the patient taking all medications as directed (includes completed medication regime)?  Yes   Does the patient have a primary care provider?   Yes   Does the patient have an appointment with their PCP or pulmonologist within 7 days of discharge?  Yes   Has the patient kept scheduled appointments due by today?  Yes   Comments  Patient has spoke with his PCP by phone and has an appt set up for 4 weeks   Has home health visited the patient within 72 hours of discharge?  N/A   Did the patient receive a copy of their discharge instructions?  Yes   Nursing interventions  Reviewed instructions with patient   What is the patient's perception of their health status since discharge?  Improving   Is the patient/caregiver able to teach back the hierarchy of who to call/visit for symptoms/problems? PCP, Specialist, Home health nurse, Urgent Care, ED, 911  Yes   Additional teach back comments  Patient states he is feeling better today than he did yesterday.  Has some soa and a slight cough but not persist.  No chest pains noted and no fever.     Is the patient/caregiver able to teach back signs and symptoms of worsening condition:  Fever/chills, Shortness of breath, Chest pain   Is the patient/caregiver able to teach back importance of completing antibiotic course of treatment?  Yes   Week 1 call completed?  Yes   Wrap up  additional comments  Pt had questions on how long his symptoms will last.  Explained it varies from person to person.  But if any should increase he needs to call provide or seek medical attention.  States he has improvement and feels like he is getter better.           Ivanna Cadena LPN

## 2020-04-20 ENCOUNTER — TELEPHONE (OUTPATIENT)
Dept: INTERNAL MEDICINE | Facility: CLINIC | Age: 85
End: 2020-04-20

## 2020-04-20 ENCOUNTER — READMISSION MANAGEMENT (OUTPATIENT)
Dept: CALL CENTER | Facility: HOSPITAL | Age: 85
End: 2020-04-20

## 2020-04-20 NOTE — OUTREACH NOTE
"COPD/PN Week 1 Survey      Responses   [facility]   COVID-19 Test Status  Confirmed   Does the patient have one of the following disease processes/diagnoses(primary or secondary)?  COPD/Pneumonia   Is there a successful TCM telephone encounter documented?  No   Was the primary reason for admission:  Pneumonia   Week 1 attempt successful?  Yes   Call start time  1345   Call end time  1354   Meds reviewed with patient/caregiver?  Yes   Is the patient having any side effects they believe may be caused by any medication additions or changes?  No   Does the patient have all medications ordered at discharge?  N/A   Is the patient taking all medications as directed (includes completed medication regime)?  Yes   Does the patient have a primary care provider?   Yes   Does the patient have an appointment with their PCP or pulmonologist within 7 days of discharge?  Yes   Has the patient kept scheduled appointments due by today?  Yes   Has home health visited the patient within 72 hours of discharge?  N/A   What DME was ordered?  States does not have home oxygen, but will discuss with PCP today.   Psychosocial issues?  No   Did the patient receive a copy of their discharge instructions?  Yes   Nursing interventions  Reviewed instructions with patient   What is the patient's perception of their health status since discharge?  Same   Nursing Interventions  Nurse provided patient education   Is the patient/caregiver able to teach back the hierarchy of who to call/visit for symptoms/problems? PCP, Specialist, Home health nurse, Urgent Care, ED, 911  Yes   Is the patient/caregiver able to teach back signs and symptoms of worsening condition:  Fever/chills, Shortness of breath, Chest pain   Is the patient/caregiver able to teach back importance of completing antibiotic course of treatment?  Yes   Week 1 call completed?  Yes   Wrap up additional comments  STates has \"good days and bad days\". " States is a little more SOA today, and is waiting on return call from PCP. STates no fever, cough, or chest pain. Denies any respiratory distress-reviewed s/s and to call 911 if occur or if breathing worsens-voiced understanding.          Marichuy Knutson, RN

## 2020-04-20 NOTE — TELEPHONE ENCOUNTER
[facility] CALLED FOR THE PT. HE IS WANTING TO SWITCH TO THIS OFFICE. HE WAS IN THE HOSPITAL WITH COVID-19. PT HAS RECOVERED BUT STILL HAVING SHORTNESS OF BREATH. HE WOULD LIKE TO HAVE A FACE TO FACE NEW PT APPT AND IS LOOKING TO GET IN WITH A MALE PROVIDER.     PLEASE CALL TO SCHEDULE APPT.

## 2020-04-20 NOTE — TELEPHONE ENCOUNTER
Long discussion with wife. Encouraged to follow up with Dr. Bautista and PCP at moment considering he is just recovering from COVID admission. Offered new patient appointment down road with a female provider. Will discuss with  and call back.

## 2020-04-23 ENCOUNTER — READMISSION MANAGEMENT (OUTPATIENT)
Dept: CALL CENTER | Facility: HOSPITAL | Age: 85
End: 2020-04-23

## 2020-04-23 NOTE — OUTREACH NOTE
COPD/PN Week 2 Survey      Responses   [facility]   COVID-19 Test Status  Confirmed   Does the patient have one of the following disease processes/diagnoses(primary or secondary)?  COPD/Pneumonia   Was the primary reason for admission:  Pneumonia   Week 2 attempt successful?  Yes   Call start time  1135   Call end time  1139   General alerts for this patient  Quarantine x 14 days   Discharge diagnosis  Pneumonia due to COVID-19 virus Afib   Meds reviewed with patient/caregiver?  Yes   Is the patient having any side effects they believe may be caused by any medication additions or changes?  No   Does the patient have all medications ordered at discharge?  N/A   Is the patient taking all medications as directed (includes completed medication regime)?  Yes   Does the patient have a primary care provider?   Yes   Does the patient have an appointment with their PCP or pulmonologist within 7 days of discharge?  Yes   Has the patient kept scheduled appointments due by today?  Yes   Has home health visited the patient within 72 hours of discharge?  N/A   Psychosocial issues?  No   Comments  Pt reports his breathing is slowly improving and that he is slowly regaining his strenth. Reports he has some needle pains in both shoulder areas that is fleeting. Advised to contact PCP. Reports running around 94%.    Did the patient receive a copy of their discharge instructions?  Yes   Nursing interventions  Reviewed instructions with patient   What is the patient's perception of their health status since discharge?  Improving   Nursing Interventions  Nurse provided patient education   Is the patient/caregiver able to teach back the hierarchy of who to call/visit for symptoms/problems? PCP, Specialist, Home health nurse, Urgent Care, ED, 911  Yes   Is the patient/caregiver able to teach back signs and symptoms of worsening condition:  Fever/chills, Shortness of breath, Chest pain   Is the  patient/caregiver able to teach back importance of completing antibiotic course of treatment?  Yes   Week 2 call completed?  Yes   Wrap up additional comments  Pt reports he no longer has a cough.           Larry Chávez RN

## 2020-04-26 ENCOUNTER — READMISSION MANAGEMENT (OUTPATIENT)
Dept: CALL CENTER | Facility: HOSPITAL | Age: 85
End: 2020-04-26

## 2020-04-26 NOTE — OUTREACH NOTE
COPD/PN Week 2 Survey      Responses   [facility]   COVID-19 Test Status  Confirmed   Does the patient have one of the following disease processes/diagnoses(primary or secondary)?  COPD/Pneumonia   Was the primary reason for admission:  Pneumonia   Week 2 attempt successful?  Yes   Call start time  1335   Call end time  1339   General alerts for this patient  Quarantine x 14 days   Discharge diagnosis  Pneumonia due to COVID-19 virus Afib   Meds reviewed with patient/caregiver?  Yes   Is the patient having any side effects they believe may be caused by any medication additions or changes?  No   Does the patient have all medications ordered at discharge?  Yes   Is the patient taking all medications as directed (includes completed medication regime)?  Yes   Does the patient have a primary care provider?   Yes   Does the patient have an appointment with their PCP or pulmonologist within 7 days of discharge?  Yes   Has the patient kept scheduled appointments due by today?  Yes   Has home health visited the patient within 72 hours of discharge?  N/A   Comments  He still has alittle SOB but is improving. His only complaint today is pain under his left armpit. He has no fever, s/s of redness or edema in that area. No chest pain. He will discuss with his family PCP tomorrow. If worsening of symptoms will seek medical care.    Did the patient receive a copy of their discharge instructions?  Yes   Nursing interventions  Reviewed instructions with patient   What is the patient's perception of their health status since discharge?  Improving   Nursing Interventions  Nurse provided patient education   Are the patient's immunizations up to date?   Yes   Is the patient/caregiver able to teach back the hierarchy of who to call/visit for symptoms/problems? PCP, Specialist, Home health nurse, Urgent Care, ED, 911  Yes   Is the patient/caregiver able to teach back signs and symptoms of  worsening condition:  Fever/chills, Shortness of breath, Chest pain   Is the patient/caregiver able to teach back importance of completing antibiotic course of treatment?  Yes   Week 2 call completed?  Yes   Wrap up additional comments  Pt reports he no longer has a cough.           Viktor Pappas RN

## 2020-05-03 ENCOUNTER — READMISSION MANAGEMENT (OUTPATIENT)
Dept: CALL CENTER | Facility: HOSPITAL | Age: 85
End: 2020-05-03

## 2020-05-03 NOTE — OUTREACH NOTE
COPD/PN Week 3 Survey      Responses   [facility]   COVID-19 Test Status  Confirmed   Does the patient have one of the following disease processes/diagnoses(primary or secondary)?  COPD/Pneumonia   Was the primary reason for admission:  Pneumonia   Week 3 attempt successful?  Yes   Call start time  1039   Call end time  1045   Discharge diagnosis  Pneumonia due to COVID-19 virus Afib   Meds reviewed with patient/caregiver?  Yes   Does the patient have all medications ordered at discharge?  N/A   Is the patient taking all medications as directed (includes completed medication regime)?  Yes   Does the patient have a primary care provider?   Yes   Does the patient have an appointment with their PCP or pulmonologist within 7 days of discharge?  Yes   Has the patient kept scheduled appointments due by today?  Yes   Comments  Has another appt next week with his PCP   Has home health visited the patient within 72 hours of discharge?  N/A   Did the patient receive a copy of their discharge instructions?  Yes   Nursing interventions  Reviewed instructions with patient   What is the patient's perception of their health status since discharge?  New symptoms unrelated to diagnosis   Nursing Interventions  Nurse provided patient education   Additional teach back comments  Patient states he is still having some sob.  He is monitoring his sats and they are staying at 92%-93%.  He has pain under his arm which his doctor recommend to take tylenol which helps but still has some pain.  Worse at night time when sleeping.  He is going to address it again with his PCP at his appt next week.   Is the patient/caregiver able to teach back signs and symptoms of worsening condition:  Fever/chills, Shortness of breath, Chest pain   Is the patient/caregiver able to teach back importance of completing antibiotic course of treatment?  Yes   Week 3 call completed?  Yes   Wrap up additional comments  Pt to  address pain under arm with his PCP again at visit next week          Ivanna Cadena LPN

## 2020-05-12 ENCOUNTER — READMISSION MANAGEMENT (OUTPATIENT)
Dept: CALL CENTER | Facility: HOSPITAL | Age: 85
End: 2020-05-12

## 2020-05-12 NOTE — OUTREACH NOTE
COPD/PN Week 4 Survey      Responses   [facility] patient discharged from?  [place]   COVID-19 Test Status  Confirmed   Does the patient have one of the following disease processes/diagnoses(primary or secondary)?  COPD/Pneumonia   Was the primary reason for admission:  Pneumonia   Week 4 attempt successful?  No          Mandy Ware RN

## 2023-09-17 ENCOUNTER — APPOINTMENT (OUTPATIENT)
Dept: GENERAL RADIOLOGY | Facility: HOSPITAL | Age: 88
End: 2023-09-17
Payer: MEDICARE

## 2023-09-17 ENCOUNTER — HOSPITAL ENCOUNTER (OUTPATIENT)
Facility: HOSPITAL | Age: 88
Discharge: HOME | End: 2023-09-17
Attending: EMERGENCY MEDICINE | Admitting: EMERGENCY MEDICINE
Payer: MEDICARE

## 2023-09-17 VITALS
OXYGEN SATURATION: 96 % | TEMPERATURE: 97.9 F | WEIGHT: 175 LBS | BODY MASS INDEX: 25.05 KG/M2 | RESPIRATION RATE: 16 BRPM | SYSTOLIC BLOOD PRESSURE: 124 MMHG | HEIGHT: 70 IN | DIASTOLIC BLOOD PRESSURE: 83 MMHG | HEART RATE: 69 BPM

## 2023-09-17 DIAGNOSIS — T14.8XXA HEMATOMA: Primary | ICD-10-CM

## 2023-09-17 DIAGNOSIS — S93.402A SPRAIN OF LEFT ANKLE, UNSPECIFIED LIGAMENT, INITIAL ENCOUNTER: ICD-10-CM

## 2023-09-17 PROCEDURE — G0463 HOSPITAL OUTPT CLINIC VISIT: HCPCS | Performed by: NURSE PRACTITIONER

## 2023-09-17 PROCEDURE — 73610 X-RAY EXAM OF ANKLE: CPT

## 2023-09-17 NOTE — [facility] PROVIDER NOTE
Subjective   History of Present Illness  Patient is an 88-year-old male who presents complaining of left ankle and distal tib-fib pain.  States he slipped on wet pavement trying to get into his truck and hit it on something.  He denies hitting his head, neck or back pain.  He does take Eliquis.    Review of Systems   Skin:  Positive for wound.   All other systems reviewed and are negative.    Past Medical History:   Diagnosis Date    Abdominal pain, suprapubic     Acute upper respiratory infection     Atrial fibrillation     Constipation     Foot pain     HLD (hyperlipidemia)     Hypertension     Limb pain     Loss of hair     Muscular aches     Pain in soft tissues of limb     Rash     Rash        Allergies   Allergen Reactions    Aspirin Buf(Cacarb-Mgcarb-Mgo) Other (See Comments)     Upset stomach    Bactrim [Sulfamethoxazole-Trimethoprim] Rash       Past Surgical History:   Procedure Laterality Date    COLONOSCOPY      COLONOSCOPY N/A 2018    Procedure: COLONOSCOPY TO CECUM WITH COLD POLYPECTOMY;  Surgeon: Jose De Jesus Cardona MD;  Location: [facility] ENDOSCOPY;  Service:     ENDOSCOPY      ENDOSCOPY N/A 2018    Procedure: ESOPHAGOGASTRODUODENOSCOPY WITH COLD BIOPSIES;  Surgeon: Jose De Jesus Cardona MD;  Location: [facility] ENDOSCOPY;  Service:     HERNIA REPAIR         Family History   Problem Relation Age of Onset    Heart disease Brother     Diabetes Brother        Social History     Socioeconomic History    Marital status:    Tobacco Use    Smoking status: Former     Packs/day: 2.00     Years: 50.00     Pack years: 100.00     Types: Cigarettes     Quit date:      Years since quittin.7    Smokeless tobacco: Never   Substance and Sexual Activity    Alcohol use: Yes     Comment: Socially    Drug use: No    Sexual activity: Defer           Objective   Physical Exam  Vitals and nursing note reviewed.   Constitutional:       Appearance: Normal appearance.   HENT:      Head: Normocephalic and atraumatic.    Pulmonary:      Effort: Pulmonary effort is normal.   Musculoskeletal:      Cervical back: Normal range of motion and neck supple.   Skin:     General: Skin is warm and dry.      Capillary Refill: Capillary refill takes less than 2 seconds.      Findings: Bruising (Hematoma to distal anterior left tib-fib) present.   Neurological:      General: No focal deficit present.      Mental Status: He is alert and oriented to person, place, and time.       Procedures           ED Course                                           Medical Decision Making  Bilateral ankle edema noted on exam.  Hematoma to anterior left lower leg.  No acute fracture on x-ray.  Patient to follow-up with his primary care as needed, given strict return precautions.    Amount and/or Complexity of Data Reviewed  Radiology: ordered.        Final diagnoses:   Hematoma   Sprain of left ankle, unspecified ligament, initial encounter       ED Disposition  ED Disposition       ED Disposition   Discharge    Condition   Stable    Comment   --               Neda Parker MD  [facility] [place] [address] [phone]    Call   If symptoms worsen         Medication List      No changes were made to your prescriptions during this visit.
